# Patient Record
Sex: MALE | Race: WHITE | Employment: FULL TIME | URBAN - NONMETROPOLITAN AREA
[De-identification: names, ages, dates, MRNs, and addresses within clinical notes are randomized per-mention and may not be internally consistent; named-entity substitution may affect disease eponyms.]

---

## 2018-04-05 ENCOUNTER — TELEPHONE (OUTPATIENT)
Dept: FAMILY MEDICINE CLINIC | Age: 38
End: 2018-04-05

## 2018-04-18 ENCOUNTER — OFFICE VISIT (OUTPATIENT)
Dept: FAMILY MEDICINE CLINIC | Age: 38
End: 2018-04-18
Payer: COMMERCIAL

## 2018-04-18 VITALS
HEART RATE: 68 BPM | WEIGHT: 182 LBS | BODY MASS INDEX: 26.96 KG/M2 | DIASTOLIC BLOOD PRESSURE: 74 MMHG | HEIGHT: 69 IN | SYSTOLIC BLOOD PRESSURE: 132 MMHG | TEMPERATURE: 98.7 F

## 2018-04-18 DIAGNOSIS — Z13.220 SCREENING FOR HYPERLIPIDEMIA: Primary | ICD-10-CM

## 2018-04-18 DIAGNOSIS — R53.82 CHRONIC FATIGUE: ICD-10-CM

## 2018-04-18 PROCEDURE — 99203 OFFICE O/P NEW LOW 30 MIN: CPT | Performed by: NURSE PRACTITIONER

## 2018-04-18 ASSESSMENT — PATIENT HEALTH QUESTIONNAIRE - PHQ9
2. FEELING DOWN, DEPRESSED OR HOPELESS: 0
SUM OF ALL RESPONSES TO PHQ QUESTIONS 1-9: 0
SUM OF ALL RESPONSES TO PHQ9 QUESTIONS 1 & 2: 0
1. LITTLE INTEREST OR PLEASURE IN DOING THINGS: 0

## 2018-05-03 ENCOUNTER — HOSPITAL ENCOUNTER (OUTPATIENT)
Age: 38
Discharge: HOME | End: 2018-05-03
Payer: COMMERCIAL

## 2018-05-03 ENCOUNTER — TELEPHONE (OUTPATIENT)
Dept: FAMILY MEDICINE CLINIC | Age: 38
End: 2018-05-03

## 2018-05-03 DIAGNOSIS — R53.82 CHRONIC FATIGUE: ICD-10-CM

## 2018-05-03 DIAGNOSIS — Z13.220 SCREENING FOR HYPERLIPIDEMIA: ICD-10-CM

## 2018-05-03 LAB
ALBUMIN SERPL-MCNC: 4.5 G/DL (ref 3.5–5.1)
ALP BLD-CCNC: 65 U/L (ref 38–126)
ALT SERPL-CCNC: 16 U/L (ref 11–66)
ANION GAP SERPL CALCULATED.3IONS-SCNC: 13 MEQ/L (ref 8–16)
ANISOCYTOSIS: ABNORMAL
AST SERPL-CCNC: 22 U/L (ref 5–40)
BASOPHILS # BLD: 0.6 %
BASOPHILS ABSOLUTE: 0.1 THOU/MM3 (ref 0–0.1)
BILIRUB SERPL-MCNC: 0.3 MG/DL (ref 0.3–1.2)
BUN BLDV-MCNC: 21 MG/DL (ref 7–22)
CALCIUM SERPL-MCNC: 9 MG/DL (ref 8.5–10.5)
CHLORIDE BLD-SCNC: 105 MEQ/L (ref 98–111)
CHOLESTEROL, TOTAL: 161 MG/DL (ref 100–199)
CO2: 22 MEQ/L (ref 23–33)
CREAT SERPL-MCNC: 1.1 MG/DL (ref 0.4–1.2)
EOSINOPHIL # BLD: 2.8 %
EOSINOPHILS ABSOLUTE: 0.2 THOU/MM3 (ref 0–0.4)
GFR SERPL CREATININE-BSD FRML MDRD: 75 ML/MIN/1.73M2
GLUCOSE BLD-MCNC: 94 MG/DL (ref 70–108)
HCT VFR BLD CALC: 41 % (ref 42–52)
HDLC SERPL-MCNC: 36 MG/DL
HEMOGLOBIN: 13.7 GM/DL (ref 14–18)
LDL CHOLESTEROL CALCULATED: 110 MG/DL
LYMPHOCYTES # BLD: 35.1 %
LYMPHOCYTES ABSOLUTE: 3.1 THOU/MM3 (ref 1–4.8)
MCH RBC QN AUTO: 30.6 PG (ref 27–31)
MCHC RBC AUTO-ENTMCNC: 33.5 GM/DL (ref 33–37)
MCV RBC AUTO: 91.4 FL (ref 80–94)
MONOCYTES # BLD: 8.6 %
MONOCYTES ABSOLUTE: 0.8 THOU/MM3 (ref 0.4–1.3)
NUCLEATED RED BLOOD CELLS: 0 /100 WBC
PDW BLD-RTO: 14.5 % (ref 11.5–14.5)
PLATELET # BLD: 275 THOU/MM3 (ref 130–400)
PMV BLD AUTO: 8.9 FL (ref 7.4–10.4)
POTASSIUM SERPL-SCNC: 4.2 MEQ/L (ref 3.5–5.2)
RBC # BLD: 4.49 MILL/MM3 (ref 4.7–6.1)
SEG NEUTROPHILS: 52.9 %
SEGMENTED NEUTROPHILS ABSOLUTE COUNT: 4.7 THOU/MM3 (ref 1.8–7.7)
SODIUM BLD-SCNC: 140 MEQ/L (ref 135–145)
TOTAL PROTEIN: 6.9 G/DL (ref 6.1–8)
TRIGL SERPL-MCNC: 76 MG/DL (ref 0–199)
TSH SERPL DL<=0.05 MIU/L-ACNC: 2.72 UIU/ML (ref 0.4–4.2)
WBC # BLD: 8.9 THOU/MM3 (ref 4.8–10.8)

## 2018-05-03 PROCEDURE — 85025 COMPLETE CBC W/AUTO DIFF WBC: CPT

## 2018-05-03 PROCEDURE — 87389 HIV-1 AG W/HIV-1&-2 AB AG IA: CPT

## 2018-05-03 PROCEDURE — 36415 COLL VENOUS BLD VENIPUNCTURE: CPT

## 2018-05-03 PROCEDURE — 80061 LIPID PANEL: CPT

## 2018-05-03 PROCEDURE — 80053 COMPREHEN METABOLIC PANEL: CPT

## 2018-05-03 PROCEDURE — 84443 ASSAY THYROID STIM HORMONE: CPT

## 2018-05-05 LAB — HIV-2 AB: NEGATIVE

## 2018-05-07 ENCOUNTER — TELEPHONE (OUTPATIENT)
Dept: FAMILY MEDICINE CLINIC | Age: 38
End: 2018-05-07

## 2018-10-10 ENCOUNTER — HOSPITAL ENCOUNTER (EMERGENCY)
Age: 38
Discharge: HOME | End: 2018-10-10
Attending: EMERGENCY MEDICINE
Payer: COMMERCIAL

## 2018-10-10 VITALS
DIASTOLIC BLOOD PRESSURE: 60 MMHG | WEIGHT: 175 LBS | BODY MASS INDEX: 25.05 KG/M2 | TEMPERATURE: 98.2 F | HEART RATE: 71 BPM | OXYGEN SATURATION: 96 % | RESPIRATION RATE: 16 BRPM | SYSTOLIC BLOOD PRESSURE: 101 MMHG | HEIGHT: 70 IN

## 2018-10-10 DIAGNOSIS — B34.9 SYSTEMIC VIRAL ILLNESS: Primary | ICD-10-CM

## 2018-10-10 DIAGNOSIS — J11.1 INFLUENZA-LIKE ILLNESS: ICD-10-CM

## 2018-10-10 DIAGNOSIS — F17.200 TOBACCO USE DISORDER: ICD-10-CM

## 2018-10-10 LAB
FLU A ANTIGEN: NEGATIVE
FLU B ANTIGEN: NEGATIVE

## 2018-10-10 PROCEDURE — 6360000002 HC RX W HCPCS: Performed by: EMERGENCY MEDICINE

## 2018-10-10 PROCEDURE — 99214 OFFICE O/P EST MOD 30 MIN: CPT

## 2018-10-10 PROCEDURE — 99213 OFFICE O/P EST LOW 20 MIN: CPT | Performed by: EMERGENCY MEDICINE

## 2018-10-10 PROCEDURE — 87804 INFLUENZA ASSAY W/OPTIC: CPT

## 2018-10-10 RX ORDER — ONDANSETRON 4 MG/1
4 TABLET, ORALLY DISINTEGRATING ORAL EVERY 8 HOURS PRN
Qty: 10 TABLET | Refills: 0 | Status: SHIPPED | OUTPATIENT
Start: 2018-10-10 | End: 2022-09-23

## 2018-10-10 RX ORDER — ONDANSETRON 4 MG/1
8 TABLET, ORALLY DISINTEGRATING ORAL ONCE
Status: COMPLETED | OUTPATIENT
Start: 2018-10-10 | End: 2018-10-10

## 2018-10-10 RX ADMIN — ONDANSETRON 8 MG: 4 TABLET, ORALLY DISINTEGRATING ORAL at 09:38

## 2018-10-10 ASSESSMENT — ENCOUNTER SYMPTOMS
BACK PAIN: 0
STRIDOR: 0
SINUS PRESSURE: 0
ABDOMINAL PAIN: 0
COUGH: 0
WHEEZING: 0
SORE THROAT: 0
VOMITING: 0
DIARRHEA: 0
NAUSEA: 0
EYE DISCHARGE: 0
EYE REDNESS: 0
VOICE CHANGE: 0
EYE PAIN: 0
SHORTNESS OF BREATH: 0
TROUBLE SWALLOWING: 0

## 2018-10-10 ASSESSMENT — PAIN DESCRIPTION - DESCRIPTORS: DESCRIPTORS: SHARP

## 2018-10-10 ASSESSMENT — PAIN DESCRIPTION - PAIN TYPE: TYPE: ACUTE PAIN

## 2018-10-10 ASSESSMENT — PAIN DESCRIPTION - LOCATION: LOCATION: ABDOMEN

## 2018-10-10 ASSESSMENT — PAIN SCALES - GENERAL: PAINLEVEL_OUTOF10: 7

## 2018-10-10 NOTE — ED PROVIDER NOTES
Discharge Medication List as of 10/10/2018 10:09 AM          ALLERGIES     Patient is has No Known Allergies. FAMILY HISTORY     Patient's family history includes Cancer in his father; No Known Problems in his mother, sister, and sister. SOCIAL HISTORY     Patient  reports that he has been smoking. He has a 20.00 pack-year smoking history. He has never used smokeless tobacco. He reports that he drinks alcohol. He reports that he uses drugs, including Marijuana, about 2 times per week. PHYSICAL EXAM     ED TRIAGE VITALS  BP: 101/60, Temp: 98.2 °F (36.8 °C), Pulse: 71, Resp: 16, SpO2: 96 %  Physical Exam   Constitutional: He is oriented to person, place, and time. He appears well-developed and well-nourished. No distress. decent hydration, normal airway   HENT:   Head: Normocephalic and atraumatic. Right Ear: Tympanic membrane and external ear normal.   Left Ear: Tympanic membrane and external ear normal.   Nose: Nose normal. No rhinorrhea. Right sinus exhibits no maxillary sinus tenderness and no frontal sinus tenderness. Left sinus exhibits no maxillary sinus tenderness and no frontal sinus tenderness. Mouth/Throat: Oropharynx is clear and moist. No trismus in the jaw. No uvula swelling. No oropharyngeal exudate, posterior oropharyngeal edema, posterior oropharyngeal erythema or tonsillar abscesses. Eyes: Pupils are equal, round, and reactive to light. Conjunctivae and EOM are normal. Right eye exhibits no discharge. Left eye exhibits no discharge. No scleral icterus. Neck: Normal range of motion. No JVD present. No thyromegaly present. No meningismus   Cardiovascular: Normal rate, regular rhythm, S1 normal, S2 normal, normal heart sounds, intact distal pulses and normal pulses. Exam reveals no gallop and no friction rub. No murmur heard. Pulmonary/Chest: Effort normal and breath sounds normal. No stridor. No tachypnea. No respiratory distress. He has no decreased breath sounds.  He

## 2018-10-11 ENCOUNTER — APPOINTMENT (OUTPATIENT)
Dept: GENERAL RADIOLOGY | Age: 38
End: 2018-10-11
Payer: COMMERCIAL

## 2018-10-11 ENCOUNTER — HOSPITAL ENCOUNTER (EMERGENCY)
Age: 38
Discharge: HOME | End: 2018-10-11
Payer: COMMERCIAL

## 2018-10-11 VITALS
RESPIRATION RATE: 16 BRPM | HEART RATE: 70 BPM | BODY MASS INDEX: 25.05 KG/M2 | HEIGHT: 70 IN | DIASTOLIC BLOOD PRESSURE: 78 MMHG | OXYGEN SATURATION: 100 % | SYSTOLIC BLOOD PRESSURE: 124 MMHG | WEIGHT: 175 LBS | TEMPERATURE: 97.7 F

## 2018-10-11 DIAGNOSIS — B34.9 VIRAL ILLNESS: Primary | ICD-10-CM

## 2018-10-11 DIAGNOSIS — M25.50 POLYARTHRALGIA: ICD-10-CM

## 2018-10-11 LAB
ALBUMIN SERPL-MCNC: 4.9 G/DL (ref 3.5–5.1)
ALP BLD-CCNC: 68 U/L (ref 38–126)
ALT SERPL-CCNC: 13 U/L (ref 11–66)
ANION GAP SERPL CALCULATED.3IONS-SCNC: 16 MEQ/L (ref 8–16)
AST SERPL-CCNC: 20 U/L (ref 5–40)
BACTERIA: ABNORMAL /HPF
BASOPHILS # BLD: 0.7 %
BASOPHILS ABSOLUTE: 0 THOU/MM3 (ref 0–0.1)
BILIRUB SERPL-MCNC: 0.7 MG/DL (ref 0.3–1.2)
BILIRUBIN DIRECT: < 0.2 MG/DL (ref 0–0.3)
BILIRUBIN URINE: ABNORMAL
BLOOD, URINE: NEGATIVE
BUN BLDV-MCNC: 19 MG/DL (ref 7–22)
C-REACTIVE PROTEIN: 0.05 MG/DL (ref 0–1)
CALCIUM SERPL-MCNC: 9.6 MG/DL (ref 8.5–10.5)
CASTS 2: ABNORMAL /LPF
CASTS UA: ABNORMAL /LPF
CHARACTER, URINE: CLEAR
CHLORIDE BLD-SCNC: 100 MEQ/L (ref 98–111)
CO2: 21 MEQ/L (ref 23–33)
COLOR: ABNORMAL
CREAT SERPL-MCNC: 0.9 MG/DL (ref 0.4–1.2)
CRYSTALS, UA: ABNORMAL
EOSINOPHIL # BLD: 1.3 %
EOSINOPHILS ABSOLUTE: 0.1 THOU/MM3 (ref 0–0.4)
EPITHELIAL CELLS, UA: ABNORMAL /HPF
ERYTHROCYTE [DISTWIDTH] IN BLOOD BY AUTOMATED COUNT: 12.4 % (ref 11.5–14.5)
ERYTHROCYTE [DISTWIDTH] IN BLOOD BY AUTOMATED COUNT: 40.5 FL (ref 35–45)
GFR SERPL CREATININE-BSD FRML MDRD: > 90 ML/MIN/1.73M2
GLUCOSE BLD-MCNC: 105 MG/DL (ref 70–108)
GLUCOSE URINE: NEGATIVE MG/DL
HCT VFR BLD CALC: 40.8 % (ref 42–52)
HEMOGLOBIN: 14.2 GM/DL (ref 14–18)
ICTOTEST: NEGATIVE
IMMATURE GRANS (ABS): 0.02 THOU/MM3 (ref 0–0.07)
IMMATURE GRANULOCYTES: 0.3 %
KETONES, URINE: >= 160
LEUKOCYTE ESTERASE, URINE: NEGATIVE
LYMPHOCYTES # BLD: 37.8 %
LYMPHOCYTES ABSOLUTE: 2.6 THOU/MM3 (ref 1–4.8)
MCH RBC QN AUTO: 31 PG (ref 26–33)
MCHC RBC AUTO-ENTMCNC: 34.8 GM/DL (ref 32.2–35.5)
MCV RBC AUTO: 89.1 FL (ref 80–94)
MISCELLANEOUS 2: ABNORMAL
MONOCYTES # BLD: 8.9 %
MONOCYTES ABSOLUTE: 0.6 THOU/MM3 (ref 0.4–1.3)
NITRITE, URINE: NEGATIVE
NUCLEATED RED BLOOD CELLS: 0 /100 WBC
OSMOLALITY CALCULATION: 276.4 MOSMOL/KG (ref 275–300)
PH UA: 6
PLATELET # BLD: 286 THOU/MM3 (ref 130–400)
PMV BLD AUTO: 9.8 FL (ref 9.4–12.4)
POTASSIUM SERPL-SCNC: 3.8 MEQ/L (ref 3.5–5.2)
PROTEIN UA: ABNORMAL
RBC # BLD: 4.58 MILL/MM3 (ref 4.7–6.1)
RBC URINE: ABNORMAL /HPF
RENAL EPITHELIAL, UA: ABNORMAL
SEDIMENTATION RATE, ERYTHROCYTE: 5 MM/HR (ref 0–10)
SEG NEUTROPHILS: 51 %
SEGMENTED NEUTROPHILS ABSOLUTE COUNT: 3.5 THOU/MM3 (ref 1.8–7.7)
SODIUM BLD-SCNC: 137 MEQ/L (ref 135–145)
SPECIFIC GRAVITY, URINE: > 1.03 (ref 1–1.03)
TOTAL CK: 83 U/L (ref 55–170)
TOTAL PROTEIN: 7.5 G/DL (ref 6.1–8)
UROBILINOGEN, URINE: 1 EU/DL
WBC # BLD: 6.9 THOU/MM3 (ref 4.8–10.8)
WBC UA: ABNORMAL /HPF
YEAST: ABNORMAL

## 2018-10-11 PROCEDURE — 81001 URINALYSIS AUTO W/SCOPE: CPT

## 2018-10-11 PROCEDURE — 82248 BILIRUBIN DIRECT: CPT

## 2018-10-11 PROCEDURE — 86140 C-REACTIVE PROTEIN: CPT

## 2018-10-11 PROCEDURE — 96374 THER/PROPH/DIAG INJ IV PUSH: CPT

## 2018-10-11 PROCEDURE — 6370000000 HC RX 637 (ALT 250 FOR IP): Performed by: PHYSICIAN ASSISTANT

## 2018-10-11 PROCEDURE — 85025 COMPLETE CBC W/AUTO DIFF WBC: CPT

## 2018-10-11 PROCEDURE — 80053 COMPREHEN METABOLIC PANEL: CPT

## 2018-10-11 PROCEDURE — 73502 X-RAY EXAM HIP UNI 2-3 VIEWS: CPT

## 2018-10-11 PROCEDURE — 99284 EMERGENCY DEPT VISIT MOD MDM: CPT

## 2018-10-11 PROCEDURE — 2580000003 HC RX 258: Performed by: PHYSICIAN ASSISTANT

## 2018-10-11 PROCEDURE — 85651 RBC SED RATE NONAUTOMATED: CPT

## 2018-10-11 PROCEDURE — 6360000002 HC RX W HCPCS: Performed by: PHYSICIAN ASSISTANT

## 2018-10-11 PROCEDURE — 82550 ASSAY OF CK (CPK): CPT

## 2018-10-11 PROCEDURE — 36415 COLL VENOUS BLD VENIPUNCTURE: CPT

## 2018-10-11 PROCEDURE — 96375 TX/PRO/DX INJ NEW DRUG ADDON: CPT

## 2018-10-11 RX ORDER — IBUPROFEN 800 MG/1
800 TABLET ORAL EVERY 6 HOURS PRN
Qty: 30 TABLET | Refills: 0 | Status: SHIPPED | OUTPATIENT
Start: 2018-10-11 | End: 2022-09-23

## 2018-10-11 RX ORDER — PANTOPRAZOLE SODIUM 20 MG/1
20 TABLET, DELAYED RELEASE ORAL DAILY
Qty: 30 TABLET | Refills: 0 | Status: SHIPPED | OUTPATIENT
Start: 2018-10-11 | End: 2022-09-23

## 2018-10-11 RX ORDER — METOCLOPRAMIDE HYDROCHLORIDE 5 MG/ML
10 INJECTION INTRAMUSCULAR; INTRAVENOUS ONCE
Status: COMPLETED | OUTPATIENT
Start: 2018-10-11 | End: 2018-10-11

## 2018-10-11 RX ORDER — DIPHENHYDRAMINE HYDROCHLORIDE 50 MG/ML
25 INJECTION INTRAMUSCULAR; INTRAVENOUS ONCE
Status: COMPLETED | OUTPATIENT
Start: 2018-10-11 | End: 2018-10-11

## 2018-10-11 RX ORDER — 0.9 % SODIUM CHLORIDE 0.9 %
500 INTRAVENOUS SOLUTION INTRAVENOUS ONCE
Status: COMPLETED | OUTPATIENT
Start: 2018-10-11 | End: 2018-10-11

## 2018-10-11 RX ORDER — PROMETHAZINE HYDROCHLORIDE 25 MG/1
25 TABLET ORAL EVERY 6 HOURS PRN
Qty: 15 TABLET | Refills: 0 | Status: SHIPPED | OUTPATIENT
Start: 2018-10-11 | End: 2022-09-23

## 2018-10-11 RX ADMIN — SODIUM CHLORIDE 500 ML: 9 INJECTION, SOLUTION INTRAVENOUS at 14:38

## 2018-10-11 RX ADMIN — METOCLOPRAMIDE 10 MG: 5 INJECTION, SOLUTION INTRAMUSCULAR; INTRAVENOUS at 14:49

## 2018-10-11 RX ADMIN — DIPHENHYDRAMINE HYDROCHLORIDE 25 MG: 50 INJECTION, SOLUTION INTRAMUSCULAR; INTRAVENOUS at 14:47

## 2018-10-11 RX ADMIN — LIDOCAINE HYDROCHLORIDE: 20 SOLUTION ORAL; TOPICAL at 14:49

## 2018-10-11 ASSESSMENT — ENCOUNTER SYMPTOMS
TROUBLE SWALLOWING: 0
ABDOMINAL PAIN: 1
SHORTNESS OF BREATH: 0
NAUSEA: 1
COUGH: 0
COLOR CHANGE: 0
DIARRHEA: 0
VOMITING: 1
SORE THROAT: 0

## 2018-10-11 ASSESSMENT — PAIN SCALES - GENERAL: PAINLEVEL_OUTOF10: 8

## 2018-10-11 ASSESSMENT — PAIN DESCRIPTION - PAIN TYPE: TYPE: ACUTE PAIN

## 2018-10-11 ASSESSMENT — PAIN DESCRIPTION - ORIENTATION: ORIENTATION: RIGHT;LEFT

## 2018-10-11 ASSESSMENT — PAIN DESCRIPTION - FREQUENCY: FREQUENCY: CONTINUOUS

## 2018-10-11 NOTE — ED NOTES
Patient resting quietly in cot with eyes closed. Friend at bedside. Wakes to voice. Rates pain 7/10. Shows no signs of distress at this time. Will continue to monitor.       Compa Villasenor RN  10/11/18 5081

## 2018-10-11 NOTE — ED TRIAGE NOTES
Patient presents to ED for nausea, abdominal pain and headache since Sunday. Patient reports in the last couple of days he has had pain in bilateral hips and legs. Was seen at urgent care yesterday and sent home with a prescription for zofran that he has not filled yet. Rates pain 8/10. Shows no signs of distress. Skin warm and dry. Respirations easy and unlabored.

## 2021-07-02 ENCOUNTER — HOSPITAL ENCOUNTER (EMERGENCY)
Age: 41
Discharge: HOME | End: 2021-07-02
Payer: COMMERCIAL

## 2021-07-02 VITALS
HEART RATE: 67 BPM | OXYGEN SATURATION: 98 % | TEMPERATURE: 97.6 F | RESPIRATION RATE: 17 BRPM | SYSTOLIC BLOOD PRESSURE: 153 MMHG | DIASTOLIC BLOOD PRESSURE: 97 MMHG

## 2021-07-02 DIAGNOSIS — K04.01 ACUTE PULPITIS: Primary | ICD-10-CM

## 2021-07-02 PROCEDURE — 6370000000 HC RX 637 (ALT 250 FOR IP): Performed by: NURSE PRACTITIONER

## 2021-07-02 PROCEDURE — 99282 EMERGENCY DEPT VISIT SF MDM: CPT

## 2021-07-02 RX ORDER — AMOXICILLIN AND CLAVULANATE POTASSIUM 875; 125 MG/1; MG/1
1 TABLET, FILM COATED ORAL 2 TIMES DAILY
Qty: 14 TABLET | Refills: 0 | Status: SHIPPED | OUTPATIENT
Start: 2021-07-02 | End: 2021-07-09

## 2021-07-02 RX ORDER — HYDROCODONE BITARTRATE AND ACETAMINOPHEN 5; 325 MG/1; MG/1
1 TABLET ORAL ONCE
Status: COMPLETED | OUTPATIENT
Start: 2021-07-02 | End: 2021-07-02

## 2021-07-02 RX ORDER — AMOXICILLIN AND CLAVULANATE POTASSIUM 875; 125 MG/1; MG/1
1 TABLET, FILM COATED ORAL EVERY 12 HOURS SCHEDULED
Status: DISCONTINUED | OUTPATIENT
Start: 2021-07-02 | End: 2021-07-03 | Stop reason: HOSPADM

## 2021-07-02 RX ADMIN — Medication 5 ML: at 23:13

## 2021-07-02 RX ADMIN — AMOXICILLIN AND CLAVULANATE POTASSIUM 1 TABLET: 875; 125 TABLET, FILM COATED ORAL at 23:13

## 2021-07-02 RX ADMIN — HYDROCODONE BITARTRATE AND ACETAMINOPHEN 1 TABLET: 5; 325 TABLET ORAL at 23:13

## 2021-07-02 ASSESSMENT — PAIN SCALES - GENERAL
PAINLEVEL_OUTOF10: 10
PAINLEVEL_OUTOF10: 10

## 2021-07-02 ASSESSMENT — PAIN DESCRIPTION - LOCATION: LOCATION: TEETH

## 2021-07-02 ASSESSMENT — PAIN DESCRIPTION - PAIN TYPE: TYPE: ACUTE PAIN

## 2021-07-03 ASSESSMENT — ENCOUNTER SYMPTOMS
COLOR CHANGE: 0
BACK PAIN: 0
FACIAL SWELLING: 0
SORE THROAT: 0
WHEEZING: 0
APNEA: 0
CHOKING: 0
TROUBLE SWALLOWING: 0
VOMITING: 0
NAUSEA: 0

## 2021-07-03 NOTE — ED PROVIDER NOTES
[facility] Emergency Department    CHIEF COMPLAINT       Chief Complaint   Patient presents with    Dental Injury       Nurses Notes reviewed and I agree except as noted in the HPI. HISTORY OF PRESENT ILLNESS    Lucie Miller is a 39 y.o. male who presents to the ED for evaluation of dental pain. Patient notes pain to the left lower molars, he notes that he was eating something recently and felt a crack. He notes history of periodontal disease. He notes he follows with [facility] dentistry and plans to have all of his teeth removed. He denies fevers or chills, denies any difficulty opening or closing his mouth. He notes significant pain with eating. He denies any neck pain. He denies any significant past medical history. Denies any allergies. HPI was provided by the patient. REVIEW OF SYSTEMS     Review of Systems   Constitutional: Negative for activity change, chills and fever. HENT: Positive for dental problem. Negative for congestion, ear pain, facial swelling, mouth sores, sore throat and trouble swallowing. Respiratory: Negative for apnea, choking and wheezing. Gastrointestinal: Negative for nausea and vomiting. Genitourinary: Negative for decreased urine volume, difficulty urinating and dysuria. Musculoskeletal: Negative for arthralgias and back pain. Skin: Negative for color change and rash. Allergic/Immunologic: Negative for immunocompromised state. Neurological: Negative for dizziness, weakness, light-headedness, numbness and headaches. Hematological: Does not bruise/bleed easily. Psychiatric/Behavioral: Negative for agitation, behavioral problems and confusion. PAST MEDICAL HISTORY   No past medical history on file. SURGICALHISTORY      has no past surgical history on file.     CURRENT MEDICATIONS       Discharge Medication List as of 7/2/2021 11:07 PM      CONTINUE these medications which have NOT CHANGED    Details   ibuprofen (ADVIL;MOTRIN) 800 MG tablet Take 1 tablet by mouth every 6 hours as needed for Pain, Disp-30 tablet, R-0Print      pantoprazole (PROTONIX) 20 MG tablet Take 1 tablet by mouth daily, Disp-30 tablet, R-0Print      promethazine (PHENERGAN) 25 MG tablet Take 1 tablet by mouth every 6 hours as needed for Nausea, Disp-15 tablet, R-0Print      ondansetron (ZOFRAN ODT) 4 MG disintegrating tablet Take 1 tablet by mouth every 8 hours as needed for Nausea or Vomiting (Dissolve on tongue 4 times daily for nausea and vomiting), Disp-10 tablet, R-0Print             ALLERGIES     has No Known Allergies. FAMILY HISTORY     He indicated that his mother is alive. He indicated that his father is alive. He indicated that both of his sisters are alive. family history includes Cancer in his father; No Known Problems in his mother, sister, and sister. SOCIAL HISTORY       Social History     Socioeconomic History    Marital status:      Spouse name: Not on file    Number of children: Not on file    Years of education: Not on file    Highest education level: Not on file   Occupational History    Not on file   Tobacco Use    Smoking status: Current Every Day Smoker     Packs/day: 0.50     Years: 20.00     Pack years: 10.00    Smokeless tobacco: Never Used   Vaping Use    Vaping Use: Never used   Substance and Sexual Activity    Alcohol use: Yes     Comment: rarely     Drug use: Yes     Frequency: 2.0 times per week     Types: Marijuana    Sexual activity: Yes     Partners: Female     Birth control/protection: Pill   Other Topics Concern    Not on file   Social History Narrative    Not on file     Social Determinants of Health     Financial Resource Strain:     Difficulty of Paying Living Expenses:    Food Insecurity:     Worried About Running Out of Food in the Last Year:     Ran Out of Food in the Last Year:    Transportation Needs:     Lack of Transportation (Medical):      Lack of Transportation (Non-Medical):    Physical Activity:     Days of Exercise per Week:     Minutes of Exercise per Session:    Stress:     Feeling of Stress :    Social Connections:     Frequency of Communication with Friends and Family:     Frequency of Social Gatherings with Friends and Family:     Attends Christianity Services:     Active Member of Clubs or Organizations:     Attends Club or Organization Meetings:     Marital Status:    Intimate Partner Violence:     Fear of Current or Ex-Partner:     Emotionally Abused:     Physically Abused:     Sexually Abused:        PHYSICAL EXAM     INITIAL VITALS:  oral temperature is 97.6 °F (36.4 °C). His blood pressure is 153/97 (abnormal) and his pulse is 67. His respiration is 17 and oxygen saturation is 98%. Physical Exam  Vitals and nursing note reviewed. Constitutional:       Appearance: Normal appearance. He is well-developed. HENT:      Head: Normocephalic. Right Ear: External ear normal.      Left Ear: External ear normal.      Nose: Nose normal.      Mouth/Throat:      Lips: Pink. Mouth: Mucous membranes are moist.      Dentition: Abnormal dentition. Dental tenderness and dental caries present. No gingival swelling, dental abscesses or gum lesions. Tongue: No lesions. Palate: No mass and lesions. Pharynx: Oropharynx is clear. Uvula midline. No pharyngeal swelling or posterior oropharyngeal erythema. Eyes:      Conjunctiva/sclera: Conjunctivae normal.   Cardiovascular:      Rate and Rhythm: Normal rate and regular rhythm. Heart sounds: Normal heart sounds, S1 normal and S2 normal.   Pulmonary:      Effort: Pulmonary effort is normal. No respiratory distress. Breath sounds: Normal breath sounds. Chest:      Chest wall: No tenderness. Abdominal:      General: Bowel sounds are normal. There is no distension. Palpations: Abdomen is soft. Tenderness: There is no abdominal tenderness. Musculoskeletal:         General: Normal range of motion. Cervical back: Normal range of motion and neck supple. No rigidity or tenderness. Lymphadenopathy:      Cervical: No cervical adenopathy. Skin:     General: Skin is warm and dry. Capillary Refill: Capillary refill takes less than 2 seconds. Coloration: Skin is not pale. Findings: No erythema or rash. Neurological:      General: No focal deficit present. Mental Status: He is alert and oriented to person, place, and time. Psychiatric:         Behavior: Behavior normal.         Thought Content: Thought content normal.         Judgment: Judgment normal.         DIFFERENTIAL DIAGNOSIS:   Dental abscess, periodontal disease, pulpitis,    DIAGNOSTIC RESULTS       RADIOLOGY: non-plainfilm images(s) such as CT, Ultrasound and MRI are read by the radiologist.  Plain radiographic images are visualized and preliminarily interpreted by the emergency physician unless otherwise stated below. No orders to display         LABS:   Labs Reviewed - No data to display    EMERGENCY DEPARTMENT COURSE:   Vitals:    Vitals:    07/02/21 2210   BP: (!) 153/97   Pulse: 67   Resp: 17   Temp: 97.6 °F (36.4 °C)   TempSrc: Oral   SpO2: 98%     MDM    Patient was seen and evaluated in the emergency department, patient appeared to be in no acute distress, vital signs reviewed, hypertension noted. Physical exam was completed, there was multiple cavities throughout the teeth, there was some slight erythema to the gumline, no significant edema, minimal tooth tenderness. No trismus no neck pain no significant adenopathy. Discussed my findings my plan of care the patient is amenable with discharge. History with medications below, he is advised to follow-up with dentistry, return to the emergency department there worsening signs and symptoms. He verbalized understanding plan of care.   Medications   magic (miracle) mouthwash (5 mLs Swish & Spit Given 7/2/21 8601)   HYDROcodone-acetaminophen (NORCO) 5-325 MG per tablet 1 tablet (1 tablet Oral Given 7/2/21 5418)       Patient was seenindependently by myself. The patient's final impression and disposition and plan was determined by myself. CRITICAL CARE:   None    CONSULTS:  None    PROCEDURES:  None    FINAL IMPRESSION     1. Acute pulpitis          DISPOSITION/PLAN   Patient discharged in stable condition    PATIENT REFERREDTO:  your dentist    Call   For follow up and evaluation      DISCHARGE MEDICATIONS:  Discharge Medication List as of 7/2/2021 11:07 PM      START taking these medications    Details   amoxicillin-clavulanate (AUGMENTIN) 875-125 MG per tablet Take 1 tablet by mouth 2 times daily for 7 days, Disp-14 tablet, R-0Normal      Magic Mouthwash (MIRACLE MOUTHWASH) Swish and spit 5 mLs 4 times daily as needed for Irritation 1:1:1, lidocaine, diphenhydramine, maalox, Disp-240 mL, R-0Normal             (Please note that portions of this note were completed with a voice recognition program.  Efforts were made to edit the dictations but occasionally words are mis-transcribed.)    Provider:  I personally performed the services described in the documentation,reviewed and edited the documentation which was dictated to the scribe in my presence, and it accurately records my words and actions.     Rebel Hernandez CNP 07/03/21 6:09 PM    Derik Hernandez APRGRISEL - SONALI        [employer], CHERI - CNP  07/03/21 8709

## 2022-01-12 ENCOUNTER — TELEPHONE (OUTPATIENT)
Dept: FAMILY MEDICINE CLINIC | Age: 42
End: 2022-01-12

## 2022-01-12 NOTE — TELEPHONE ENCOUNTER
----- Message from Deidra Jazmyn sent at 1/12/2022  8:17 AM EST -----  Subject: Message to Provider    QUESTIONS  Information for Provider? Patient tested positive for Covid yesterday at   [facility]. He would like to know if there is anything he can take for this. He is experiencing a no taste or smell, no appetite, fatigue, cough, sore   throat, and he had a headache last week. Please advise  ---------------------------------------------------------------------------  --------------  CALL BACK INFO  What is the best way for the office to contact you? OK to leave message on   voicemail  Preferred Call Back Phone Number? [phone]  ---------------------------------------------------------------------------  --------------  SCRIPT ANSWERS  Relationship to Patient?  Self

## 2022-01-12 NOTE — TELEPHONE ENCOUNTER
Honestly, if he is able to manage symptoms with OTC meds, he doesn't need to do anything else. I would suggest taking 500 mg vitamin C, 5000 units vitamin D and 50 mg zinc which can help boost his immune system. If he's unable to manage symptoms, i'm ok to see him for a RED appt.

## 2022-04-20 ENCOUNTER — HOSPITAL ENCOUNTER (EMERGENCY)
Age: 42
Discharge: HOME | End: 2022-04-20
Payer: COMMERCIAL

## 2022-04-20 VITALS
DIASTOLIC BLOOD PRESSURE: 73 MMHG | HEIGHT: 71 IN | SYSTOLIC BLOOD PRESSURE: 139 MMHG | RESPIRATION RATE: 18 BRPM | WEIGHT: 190 LBS | OXYGEN SATURATION: 97 % | TEMPERATURE: 98.5 F | BODY MASS INDEX: 26.6 KG/M2 | HEART RATE: 66 BPM

## 2022-04-20 DIAGNOSIS — K05.00 GINGIVITIS, ACUTE, PLAQUE INDUCED: Primary | ICD-10-CM

## 2022-04-20 DIAGNOSIS — K08.89 PAIN, DENTAL: ICD-10-CM

## 2022-04-20 PROCEDURE — 99202 OFFICE O/P NEW SF 15 MIN: CPT | Performed by: NURSE PRACTITIONER

## 2022-04-20 PROCEDURE — 99213 OFFICE O/P EST LOW 20 MIN: CPT

## 2022-04-20 RX ORDER — AMOXICILLIN AND CLAVULANATE POTASSIUM 875; 125 MG/1; MG/1
1 TABLET, FILM COATED ORAL 2 TIMES DAILY
Qty: 20 TABLET | Refills: 0 | Status: SHIPPED | OUTPATIENT
Start: 2022-04-20 | End: 2022-04-30

## 2022-04-20 RX ORDER — CHLORHEXIDINE GLUCONATE 0.12 MG/ML
15 RINSE ORAL 2 TIMES DAILY
Qty: 420 ML | Refills: 0 | Status: SHIPPED | OUTPATIENT
Start: 2022-04-20 | End: 2022-05-04

## 2022-04-20 ASSESSMENT — ENCOUNTER SYMPTOMS
NAUSEA: 0
EYE DISCHARGE: 0
FACIAL SWELLING: 0
SHORTNESS OF BREATH: 0
SORE THROAT: 0
RHINORRHEA: 0
COUGH: 0
VOMITING: 0
DIARRHEA: 0
TROUBLE SWALLOWING: 0
EYE REDNESS: 0

## 2022-04-20 NOTE — ED NOTES
Pt presents to [facility] with c/o lower gum pain and swelling.       Kael Johnson LPN  80/99/88 1240

## 2022-04-20 NOTE — ED PROVIDER NOTES
[id number]       Chief Complaint   Patient presents with    Oral Swelling     bottom       Nurses Notes reviewed and I agree except as noted in the HPI. HISTORY OF PRESENT ILLNESS   Aurora Ray is a 43 y.o. male who presents for evaluation of dental/gingival pain. Onset less than 1 week ago, worsening. Patient has not been to the dentist in [facility] long time. \"  No fever or facial swelling. Patient has plans to follow-up with a dentist for teeth removal.  No improvement with homeopathic/over-the-counter treatment. REVIEW OF SYSTEMS     Review of Systems   Constitutional: Negative for chills, diaphoresis, fatigue and fever. HENT: Positive for dental problem. Negative for congestion, ear pain, facial swelling, mouth sores, rhinorrhea, sore throat and trouble swallowing. Eyes: Negative for discharge and redness. Respiratory: Negative for cough and shortness of breath. Cardiovascular: Negative for chest pain. Gastrointestinal: Negative for diarrhea, nausea and vomiting. Genitourinary: Negative for decreased urine volume. Musculoskeletal: Negative for neck pain and neck stiffness. Skin: Negative for rash. Neurological: Negative for headaches. Hematological: Negative for adenopathy. Psychiatric/Behavioral: Negative for sleep disturbance. PAST MEDICAL HISTORY   No past medical history on file. SURGICAL HISTORY     Patient  has no past surgical history on file.     CURRENT MEDICATIONS       Discharge Medication List as of 4/20/2022 10:03 AM      CONTINUE these medications which have NOT CHANGED    Details   Magic Mouthwash (MIRACLE MOUTHWASH) Swish and spit 5 mLs 4 times daily as needed for Irritation 1:1:1, lidocaine, diphenhydramine, maalox, Disp-240 mL, R-0Normal      ibuprofen (ADVIL;MOTRIN) 800 MG tablet Take 1 tablet by mouth every 6 hours as needed for Pain, Disp-30 tablet, R-0Print      pantoprazole (PROTONIX) 20 MG tablet Take 1 tablet by mouth daily, Disp-30 tablet, R-0Print      promethazine (PHENERGAN) 25 MG tablet Take 1 tablet by mouth every 6 hours as needed for Nausea, Disp-15 tablet, R-0Print      ondansetron (ZOFRAN ODT) 4 MG disintegrating tablet Take 1 tablet by mouth every 8 hours as needed for Nausea or Vomiting (Dissolve on tongue 4 times daily for nausea and vomiting), Disp-10 tablet, R-0Print             ALLERGIES     Patient is has No Known Allergies. FAMILY HISTORY     Patient'sfamily history includes Cancer in his father; No Known Problems in his mother, sister, and sister. SOCIAL HISTORY     Patient  reports that he has been smoking. He has a 10.00 pack-year smoking history. He has never used smokeless tobacco. He reports current alcohol use. He reports current drug use. Frequency: 2.00 times per week. Drug: Marijuana [place]). PHYSICAL EXAM     ED TRIAGE VITALS  BP: 139/73, Temp: 98.5 °F (36.9 °C), Pulse: 66, Resp: 18, SpO2: 97 %  Physical Exam  Vitals and nursing note reviewed. Constitutional:       General: He is not in acute distress. Appearance: Normal appearance. He is well-developed. He is not ill-appearing, toxic-appearing or diaphoretic. HENT:      Head: Normocephalic and atraumatic. Jaw: No trismus. Right Ear: Hearing, tympanic membrane, ear canal and external ear normal. No mastoid tenderness. No hemotympanum. Tympanic membrane is not perforated, erythematous or bulging. Left Ear: Hearing, tympanic membrane, ear canal and external ear normal. No mastoid tenderness. No hemotympanum. Tympanic membrane is not perforated, erythematous or bulging. Nose: Nose normal.      Mouth/Throat:      Mouth: Mucous membranes are moist. No oral lesions or angioedema. Dentition: Dental tenderness and gingival swelling present. No dental abscesses or gum lesions. Pharynx: Oropharynx is clear. Uvula midline. Tonsils: No tonsillar abscesses.    Eyes: General: No scleral icterus. Conjunctiva/sclera: Conjunctivae normal.   Neck:      Thyroid: No thyromegaly. Trachea: Trachea normal.   Cardiovascular:      Rate and Rhythm: Normal rate and regular rhythm. No extrasystoles are present. Chest Wall: PMI is not displaced. Heart sounds: Normal heart sounds. No murmur heard. No friction rub. No gallop. Pulmonary:      Effort: Pulmonary effort is normal. No accessory muscle usage or respiratory distress. Breath sounds: Normal breath sounds. Chest:   Breasts:      Right: No supraclavicular adenopathy. Left: No supraclavicular adenopathy. Musculoskeletal:      Cervical back: Normal range of motion and neck supple. Lymphadenopathy:      Head:      Right side of head: No submental, submandibular, tonsillar, preauricular, posterior auricular or occipital adenopathy. Left side of head: No submental, submandibular, tonsillar, preauricular, posterior auricular or occipital adenopathy. Cervical: No cervical adenopathy. Upper Body:      Right upper body: No supraclavicular adenopathy. Left upper body: No supraclavicular adenopathy. Skin:     General: Skin is warm and dry. Coloration: Skin is not pale. Findings: No rash. Comments: Skin intact, warm and dry to touch, no rashes noted on exposed surfaces. Neurological:      Mental Status: He is alert and oriented to person, place, and time. He is not disoriented. Psychiatric:         Mood and Affect: Mood normal.         Behavior: Behavior is cooperative. DIAGNOSTIC RESULTS   Labs: No results found for this visit on 04/20/22.     IMAGING:  No orders to display     URGENT CARE COURSE:     Vitals:    04/20/22 0934   BP: 139/73   Pulse: 66   Resp: 18   Temp: 98.5 °F (36.9 °C)   TempSrc: Temporal   SpO2: 97%   Weight: 190 lb (86.2 kg)   Height: 5' 11\" (1.803 m)       Medications - No data to display  PROCEDURES:  None  FINALIMPRESSION      1. Gingivitis, acute, plaque induced    2. Pain, dental        DISPOSITION/PLAN   DISPOSITION Decision To Discharge 04/20/2022 10:02:09 AM  Nontoxic, no distress. Treat for acute gingivitis/dental pain. Medications as prescribed. Daily probiotic. Diet as tolerated. Follow-up with dentist.  If symptoms worsen return or go to ER. PATIENT REFERRED TO:  Dia Taylor, APRN - CNP  [address]  [address] [place] [address] [address] [address] [phone]      Follow-up as needed. Follow-up with dentist.  Medications as prescribed. If symptoms worsen return or go to ER.     DISCHARGE MEDICATIONS:  Discharge Medication List as of 4/20/2022 10:03 AM      START taking these medications    Details   chlorhexidine (PERIDEX) 0.12 % solution Take 15 mLs by mouth 2 times daily for 14 days, Disp-420 mL, R-0Normal      amoxicillin-clavulanate (AUGMENTIN) 875-125 MG per tablet Take 1 tablet by mouth 2 times daily for 10 days, Disp-20 tablet, R-0Normal           Discharge Medication List as of 4/20/2022 10:03 AM          [address] Monet Lau, APRN - CNP  04/20/22 1048

## 2022-09-23 ENCOUNTER — HOSPITAL ENCOUNTER (EMERGENCY)
Age: 42
Discharge: HOME | End: 2022-09-23
Attending: NURSE PRACTITIONER
Payer: COMMERCIAL

## 2022-09-23 ENCOUNTER — APPOINTMENT (OUTPATIENT)
Dept: GENERAL RADIOLOGY | Age: 42
End: 2022-09-23
Payer: COMMERCIAL

## 2022-09-23 VITALS
DIASTOLIC BLOOD PRESSURE: 78 MMHG | RESPIRATION RATE: 16 BRPM | HEART RATE: 78 BPM | SYSTOLIC BLOOD PRESSURE: 131 MMHG | OXYGEN SATURATION: 98 % | TEMPERATURE: 98.3 F

## 2022-09-23 DIAGNOSIS — S20.212A RIB CONTUSION, LEFT, INITIAL ENCOUNTER: Primary | ICD-10-CM

## 2022-09-23 PROCEDURE — 99213 OFFICE O/P EST LOW 20 MIN: CPT | Performed by: EMERGENCY MEDICINE

## 2022-09-23 PROCEDURE — 99213 OFFICE O/P EST LOW 20 MIN: CPT

## 2022-09-23 PROCEDURE — G0463 HOSPITAL OUTPT CLINIC VISIT: HCPCS

## 2022-09-23 PROCEDURE — 71101 X-RAY EXAM UNILAT RIBS/CHEST: CPT

## 2022-09-23 RX ORDER — CYCLOBENZAPRINE HCL 10 MG
10 TABLET ORAL 3 TIMES DAILY PRN
Qty: 21 TABLET | Refills: 0 | Status: SHIPPED | OUTPATIENT
Start: 2022-09-23 | End: 2022-10-03

## 2022-09-23 ASSESSMENT — PAIN DESCRIPTION - LOCATION: LOCATION: RIB CAGE

## 2022-09-23 ASSESSMENT — PAIN DESCRIPTION - FREQUENCY: FREQUENCY: CONTINUOUS

## 2022-09-23 ASSESSMENT — PAIN DESCRIPTION - ORIENTATION: ORIENTATION: LEFT

## 2022-09-23 ASSESSMENT — PAIN DESCRIPTION - PAIN TYPE: TYPE: ACUTE PAIN

## 2022-09-23 ASSESSMENT — ENCOUNTER SYMPTOMS
SHORTNESS OF BREATH: 0
WHEEZING: 0
ABDOMINAL PAIN: 0
COUGH: 1

## 2022-09-23 ASSESSMENT — PAIN SCALES - GENERAL: PAINLEVEL_OUTOF10: 7

## 2022-09-23 ASSESSMENT — PAIN - FUNCTIONAL ASSESSMENT: PAIN_FUNCTIONAL_ASSESSMENT: 0-10

## 2022-09-23 NOTE — ED PROVIDER NOTES
[facility] 36  Urgent Care Encounter       CHIEF COMPLAINT       Chief Complaint   Patient presents with    Rib Injury       Nurses Notes reviewed and I agree except as noted in the HPI. HISTORY OF PRESENT ILLNESS   Nadine Nava is a 43 y.o. male who presents for complaints of left-sided rib pain. Patient states he fell last week, he tripped over his dog. He fell and tried to catch himself with his arm. He states his elbow pressed rate into his left lateral ribs. He has been having pain and tenderness to this area. Last night he states he coughed and he felt a pop on one of the ribs. He states he was short of breath for a period of time. He states it is painful to take a deep breath. Patient denies any shortness of breath. Nonproductive cough that is rare. No fevers. Denies abdominal pain or any other injuries from his fall. HPI    REVIEW OF SYSTEMS     Review of Systems   Constitutional:  Negative for activity change, fatigue and fever. Respiratory:  Positive for cough. Negative for shortness of breath and wheezing. Cardiovascular:  Positive for chest pain (left sided rib pain). Gastrointestinal:  Negative for abdominal pain. Neurological:  Negative for dizziness and headaches. PAST MEDICAL HISTORY   History reviewed. No pertinent past medical history. SURGICALHISTORY     Patient  has no past surgical history on file. CURRENT MEDICATIONS       Discharge Medication List as of 9/23/2022  8:51 AM          ALLERGIES     Patient is has No Known Allergies. Patients   There is no immunization history on file for this patient. FAMILY HISTORY     Patient's family history includes Cancer in his father; No Known Problems in his mother, sister, and sister. SOCIAL HISTORY     Patient  reports that he has been smoking. He has a 10.00 pack-year smoking history. He has never used smokeless tobacco. He reports current alcohol use. He reports current drug use. Frequency: 2.00 times per week. Drug: Marijuana Charmayne Stai). PHYSICAL EXAM     ED TRIAGE VITALS  BP: 131/78, Temp: 98.3 °F (36.8 °C), Heart Rate: 78, Resp: 16, SpO2: 98 %,Estimated body mass index is 26.5 kg/m² as calculated from the following:    Height as of 4/20/22: 5' 11\" (1.803 m). Weight as of 4/20/22: 190 lb (86.2 kg). ,No LMP for male patient. Physical Exam  Constitutional:       General: He is not in acute distress. Appearance: He is normal weight. He is not ill-appearing. HENT:      Head: Normocephalic. Cardiovascular:      Rate and Rhythm: Normal rate and regular rhythm. Pulses: Normal pulses. Heart sounds: Normal heart sounds. Pulmonary:      Effort: Pulmonary effort is normal.      Breath sounds: Normal breath sounds. Chest:      Chest wall: Tenderness (left anterior lower chest) present. No mass, swelling or crepitus. Skin:     General: Skin is warm and dry. Neurological:      General: No focal deficit present. Mental Status: He is alert and oriented to person, place, and time. DIAGNOSTIC RESULTS     Labs:No results found for this visit on 09/23/22. IMAGING:    XR RIBS LEFT INCLUDE CHEST (MIN 3 VIEWS)   Final Result   No acute findings            **This report has been created using voice recognition software. It may contain minor errors which are inherent in voice recognition technology. **      Final report electronically signed by Dr. Rosemarie Pitts on 9/23/2022 8:41 AM            EKG:      URGENT CARE COURSE:     Vitals:    09/23/22 0819   BP: 131/78   Pulse: 78   Resp: 16   Temp: 98.3 °F (36.8 °C)   TempSrc: Temporal   SpO2: 98%       Medications - No data to display         PROCEDURES:  None    FINAL IMPRESSION      1. Rib contusion, left, initial encounter          DISPOSITION/ PLAN   Patient presents for his likely rib contusion. X-ray films were negative for acute fracture. Lungs are fully inflated. No acute intrathoracic process.   Patient be discharged advised to continue ibuprofen. Ice to the area. Take big, deep breaths every hour on the hour. Flexeril as prescribed as needed.   Return for new or worsening symptoms      PATIENT REFERRED TO:  CHERI Murguia CNP  [address] / [place] [address] [facility]:  Discharge Medication List as of 9/23/2022  8:51 AM        START taking these medications    Details   cyclobenzaprine (FLEXERIL) 10 MG tablet Take 1 tablet by mouth 3 times daily as needed for Muscle spasms, Disp-21 tablet, R-0Normal             Discharge Medication List as of 9/23/2022  8:51 AM        STOP taking these medications       Magic Mouthwash (MIRACLE MOUTHWASH) Comments:   Reason for Stopping:         ibuprofen (ADVIL;MOTRIN) 800 MG tablet Comments:   Reason for Stopping:         pantoprazole (PROTONIX) 20 MG tablet Comments:   Reason for Stopping:         promethazine (PHENERGAN) 25 MG tablet Comments:   Reason for Stopping:         ondansetron (ZOFRAN ODT) 4 MG disintegrating tablet Comments:   Reason for Stopping:               Discharge Medication List as of 9/23/2022  8:51 AM          Lyndal Babinski, APRN - CNP    (Please note that portions of this note were completed with a voice recognition program. Efforts were made to edit the dictations but occasionally words are mis-transcribed.)           Lyndal Babinski, APRN - CNP  09/23/22 1000

## 2022-09-23 NOTE — ED TRIAGE NOTES
Bk Richardson arrives to room with complaint of  tripped over dog left rib injury  symptoms started 1 weeks ago. Bk Richardson has pain and tenderness in his mid left ribs.

## 2022-09-23 NOTE — DISCHARGE INSTRUCTIONS
Take several deep breaths every hour on the hour while awake to expand your lungs    Continue ibuprofen as needed for pain    Flexeril may be beneficial.  This may make you sleepy. Do not drink alcohol or drive after taking.     Return for new or worsening symptoms

## 2022-09-23 NOTE — Clinical Note
Bright Contreras was seen and treated in our emergency department on 9/23/2022. He may return to work on 09/24/2022. If you have any questions or concerns, please don't hesitate to call.       Julieta Nina, APRN - CNP

## 2022-11-01 ENCOUNTER — APPOINTMENT (OUTPATIENT)
Dept: GENERAL RADIOLOGY | Age: 42
End: 2022-11-01
Payer: COMMERCIAL

## 2022-11-01 ENCOUNTER — HOSPITAL ENCOUNTER (EMERGENCY)
Age: 42
Discharge: HOME | End: 2022-11-01
Payer: COMMERCIAL

## 2022-11-01 VITALS
DIASTOLIC BLOOD PRESSURE: 69 MMHG | TEMPERATURE: 97.5 F | WEIGHT: 190 LBS | RESPIRATION RATE: 19 BRPM | SYSTOLIC BLOOD PRESSURE: 128 MMHG | HEART RATE: 53 BPM | OXYGEN SATURATION: 98 % | BODY MASS INDEX: 27.2 KG/M2 | HEIGHT: 70 IN

## 2022-11-01 DIAGNOSIS — R06.02 SHORTNESS OF BREATH: Primary | ICD-10-CM

## 2022-11-01 DIAGNOSIS — R07.9 CHEST PAIN, UNSPECIFIED TYPE: ICD-10-CM

## 2022-11-01 LAB
ANION GAP SERPL CALCULATED.3IONS-SCNC: 11 MEQ/L (ref 8–16)
BASOPHILS # BLD: 0.6 %
BASOPHILS ABSOLUTE: 0 THOU/MM3 (ref 0–0.1)
BUN BLDV-MCNC: 14 MG/DL (ref 7–22)
CALCIUM SERPL-MCNC: 9.8 MG/DL (ref 8.5–10.5)
CHLORIDE BLD-SCNC: 106 MEQ/L (ref 98–111)
CO2: 22 MEQ/L (ref 23–33)
CREAT SERPL-MCNC: 1.1 MG/DL (ref 0.4–1.2)
EKG ATRIAL RATE: 67 BPM
EKG P AXIS: 72 DEGREES
EKG P-R INTERVAL: 146 MS
EKG Q-T INTERVAL: 422 MS
EKG QRS DURATION: 94 MS
EKG QTC CALCULATION (BAZETT): 445 MS
EKG R AXIS: 80 DEGREES
EKG T AXIS: 65 DEGREES
EKG VENTRICULAR RATE: 67 BPM
EOSINOPHIL # BLD: 1.8 %
EOSINOPHILS ABSOLUTE: 0.1 THOU/MM3 (ref 0–0.4)
ERYTHROCYTE [DISTWIDTH] IN BLOOD BY AUTOMATED COUNT: 12.9 % (ref 11.5–14.5)
ERYTHROCYTE [DISTWIDTH] IN BLOOD BY AUTOMATED COUNT: 42.6 FL (ref 35–45)
GFR SERPL CREATININE-BSD FRML MDRD: > 60 ML/MIN/1.73M2
GLUCOSE BLD-MCNC: 98 MG/DL (ref 70–108)
HCT VFR BLD CALC: 39.5 % (ref 42–52)
HEMOGLOBIN: 13.5 GM/DL (ref 14–18)
IMMATURE GRANS (ABS): 0.01 THOU/MM3 (ref 0–0.07)
IMMATURE GRANULOCYTES: 0.2 %
LYMPHOCYTES # BLD: 40.7 %
LYMPHOCYTES ABSOLUTE: 2.6 THOU/MM3 (ref 1–4.8)
MCH RBC QN AUTO: 30.8 PG (ref 26–33)
MCHC RBC AUTO-ENTMCNC: 34.2 GM/DL (ref 32.2–35.5)
MCV RBC AUTO: 90 FL (ref 80–94)
MONOCYTES # BLD: 7.8 %
MONOCYTES ABSOLUTE: 0.5 THOU/MM3 (ref 0.4–1.3)
NUCLEATED RED BLOOD CELLS: 0 /100 WBC
OSMOLALITY CALCULATION: 278 MOSMOL/KG (ref 275–300)
PLATELET # BLD: 275 THOU/MM3 (ref 130–400)
PMV BLD AUTO: 9.6 FL (ref 9.4–12.4)
POTASSIUM SERPL-SCNC: 4.1 MEQ/L (ref 3.5–5.2)
RBC # BLD: 4.39 MILL/MM3 (ref 4.7–6.1)
SEG NEUTROPHILS: 48.9 %
SEGMENTED NEUTROPHILS ABSOLUTE COUNT: 3.1 THOU/MM3 (ref 1.8–7.7)
SODIUM BLD-SCNC: 139 MEQ/L (ref 135–145)
TROPONIN T: < 0.01 NG/ML
TROPONIN T: < 0.01 NG/ML
TSH SERPL DL<=0.05 MIU/L-ACNC: 2.02 UIU/ML (ref 0.4–4.2)
WBC # BLD: 6.3 THOU/MM3 (ref 4.8–10.8)

## 2022-11-01 PROCEDURE — 93005 ELECTROCARDIOGRAM TRACING: CPT | Performed by: EMERGENCY MEDICINE

## 2022-11-01 PROCEDURE — 93010 ELECTROCARDIOGRAM REPORT: CPT | Performed by: INTERNAL MEDICINE

## 2022-11-01 PROCEDURE — 6370000000 HC RX 637 (ALT 250 FOR IP): Performed by: NURSE PRACTITIONER

## 2022-11-01 PROCEDURE — 84443 ASSAY THYROID STIM HORMONE: CPT

## 2022-11-01 PROCEDURE — 36415 COLL VENOUS BLD VENIPUNCTURE: CPT

## 2022-11-01 PROCEDURE — 71045 X-RAY EXAM CHEST 1 VIEW: CPT

## 2022-11-01 PROCEDURE — 80048 BASIC METABOLIC PNL TOTAL CA: CPT

## 2022-11-01 PROCEDURE — 85025 COMPLETE CBC W/AUTO DIFF WBC: CPT

## 2022-11-01 PROCEDURE — 99285 EMERGENCY DEPT VISIT HI MDM: CPT

## 2022-11-01 PROCEDURE — 84484 ASSAY OF TROPONIN QUANT: CPT

## 2022-11-01 RX ORDER — ASPIRIN 81 MG/1
324 TABLET, CHEWABLE ORAL ONCE
Status: COMPLETED | OUTPATIENT
Start: 2022-11-01 | End: 2022-11-01

## 2022-11-01 RX ADMIN — ASPIRIN 81 MG 324 MG: 81 TABLET ORAL at 11:21

## 2022-11-01 ASSESSMENT — ENCOUNTER SYMPTOMS
COLOR CHANGE: 0
RHINORRHEA: 0
SHORTNESS OF BREATH: 1
CHEST TIGHTNESS: 1
VOMITING: 0
ABDOMINAL DISTENTION: 0
NAUSEA: 0
ABDOMINAL PAIN: 0
SORE THROAT: 0
DIARRHEA: 0
COUGH: 0

## 2022-11-01 ASSESSMENT — PAIN DESCRIPTION - DESCRIPTORS: DESCRIPTORS: ACHING

## 2022-11-01 ASSESSMENT — PAIN SCALES - GENERAL: PAINLEVEL_OUTOF10: 4

## 2022-11-01 ASSESSMENT — PAIN - FUNCTIONAL ASSESSMENT: PAIN_FUNCTIONAL_ASSESSMENT: 0-10

## 2022-11-01 ASSESSMENT — PAIN DESCRIPTION - LOCATION: LOCATION: HEAD

## 2022-11-01 ASSESSMENT — PAIN DESCRIPTION - PAIN TYPE: TYPE: ACUTE PAIN

## 2022-11-01 ASSESSMENT — HEART SCORE: ECG: 0

## 2022-11-01 NOTE — ED TRIAGE NOTES
Pt to ED via EMS w/rprts of sudden on set of chest pain, diaphoretic and generalized malaise. Pt rprts was at work this morning when suddenly became sweaty, weak and lightheaded. Sat in the car for about 30 minutes and began to hyperventilate worsening symptoms, feeling clammy, cold and numbness. Pt placed on monitor and in gown. EKG obtained. Protocol orders initiated.

## 2022-11-01 NOTE — ED NOTES
Pt denies all pains at this time. [place] provided. Call light remains within reach. Door shut.       Miri Rees RN  11/01/22 1037

## 2022-11-01 NOTE — ED PROVIDER NOTES
[facility] Emergency Department    CHIEF COMPLAINT       Chief Complaint   Patient presents with    Chest Pain    Dizziness              Nurses Notes reviewed and I agree except as noted in the HPI. HISTORY OF PRESENT ILLNESS    Jane Cano is a 43 y.o. male who presents to the ED for evaluation of chest pain, dizziness. Patient notes symptoms began this morning. Patient notes while at work overnight he began feeling funny around 5 AM.  He notes he started having significant sweats. He notes his vision was getting blurry. He was having shortness of breath and chest pain. He denies any nausea with this. He felt lightheaded and started having tremors. He noted symptoms would get worse with any kind of activity. He went to his office, and told his supervisor about this and they called 911. After this he started feeling numbness in his hands and feet. He denies any current symptoms. Denies being treated with any medications by EMS in route. He notes a family history of type 1 diabetes for his son. He denies any history of coronary artery disease for himself or his family. He notes he has not seen a doctor in probably 10 years. He notes he has smoked marijuana for 25 years and he quit 3 weeks ago. Also notes that he smoked cigarettes for 25 years and quit 2 weeks ago. Notes that he has a very stressful job and has had increased levels of stress lately. He notes he has been working long hours which is increased his stress level. He notes he plans on changing jobs in the future. \  HPI was provided by the patient. REVIEW OF SYSTEMS     Review of Systems   Constitutional:  Positive for activity change and diaphoresis. Negative for appetite change and fatigue. HENT:  Negative for congestion, rhinorrhea and sore throat. Respiratory:  Positive for chest tightness and shortness of breath. Negative for cough. Cardiovascular:  Positive for chest pain.    Gastrointestinal:  Negative for abdominal distention, abdominal pain, diarrhea, nausea and vomiting. Genitourinary:  Negative for decreased urine volume, difficulty urinating and dysuria. Musculoskeletal:  Negative for arthralgias. Skin:  Negative for color change and rash. Neurological:  Positive for dizziness, tremors and numbness. Negative for weakness. Hematological:  Does not bruise/bleed easily. Psychiatric/Behavioral:  Negative for agitation, behavioral problems and confusion. The patient is nervous/anxious. PAST MEDICAL HISTORY   History reviewed. No pertinent past medical history. SURGICALHISTORY      has no past surgical history on file. CURRENT MEDICATIONS       There are no discharge medications for this patient. ALLERGIES     has No Known Allergies. FAMILY HISTORY     He indicated that his mother is alive. He indicated that his father is alive. He indicated that both of his sisters are alive. family history includes Cancer in his father; No Known Problems in his mother, sister, and sister.     SOCIAL HISTORY       Social History     Socioeconomic History    Marital status:      Spouse name: Not on file    Number of children: Not on file    Years of education: Not on file    Highest education level: Not on file   Occupational History    Not on file   Tobacco Use    Smoking status: Every Day     Packs/day: 0.50     Years: 20.00     Pack years: 10.00     Types: Cigarettes    Smokeless tobacco: Never   Vaping Use    Vaping Use: Never used   Substance and Sexual Activity    Alcohol use: Yes     Comment: rarely     Drug use: Yes     Frequency: 2.0 times per week     Types: Marijuana Eleonora Cutler)    Sexual activity: Yes     Partners: Female     Birth control/protection: Pill   Other Topics Concern    Not on file   Social History Narrative    Not on file     Social Determinants of Health     Financial Resource Strain: Not on file   Food Insecurity: Not on file   Transportation Needs: Not on file   Physical Activity: Not on file   Stress: Not on file   Social Connections: Not on file   Intimate Partner Violence: Not on file   Housing Stability: Not on file       PHYSICAL EXAM     INITIAL VITALS:  height is 5' 10\" (1.778 m) and weight is 190 lb (86.2 kg). His oral temperature is 97.5 °F (36.4 °C). His blood pressure is 128/69 and his pulse is 53. His respiration is 19 and oxygen saturation is 98%. Physical Exam  Vitals and nursing note reviewed. Constitutional:       Appearance: Normal appearance. He is well-developed. HENT:      Head: Normocephalic. Right Ear: External ear normal.      Left Ear: External ear normal.      Nose: Nose normal.      Mouth/Throat:      Pharynx: Uvula midline. Eyes:      Conjunctiva/sclera: Conjunctivae normal.   Cardiovascular:      Rate and Rhythm: Normal rate and regular rhythm. Pulses: Normal pulses. Heart sounds: Normal heart sounds, S1 normal and S2 normal. No murmur heard. Pulmonary:      Effort: Pulmonary effort is normal. No respiratory distress. Breath sounds: Normal breath sounds. Chest:      Chest wall: No tenderness. Abdominal:      General: Bowel sounds are normal. There is no distension. Palpations: Abdomen is soft. Tenderness: There is no abdominal tenderness. Musculoskeletal:         General: Normal range of motion. Cervical back: Normal range of motion and neck supple. Skin:     General: Skin is warm and dry. Coloration: Skin is not pale. Findings: No erythema or rash. Neurological:      General: No focal deficit present. Mental Status: He is alert and oriented to person, place, and time. Cranial Nerves: No cranial nerve deficit. Sensory: No sensory deficit. Motor: No weakness. Gait: Gait normal.   Psychiatric:         Mood and Affect: Mood normal.         Behavior: Behavior normal.         Thought Content:  Thought content normal.         Judgment: Judgment normal.       DIFFERENTIAL DIAGNOSIS:   ACS, angina, anxiety, pneumonia, hyperthyroidism,    DIAGNOSTIC RESULTS     EKG: All EKG's are interpreted by the Emergency Department Physician who eithersigns or Co-signs this chart in the absence of a cardiologist.    EKG read and interpreted by myself gives impression of normal sinus rhythm with heart rate of 67; interval 146; QRS 94;QTc 445; axis P-72, R-80, T-65. RADIOLOGY: non-plainfilm images(s) such as CT, Ultrasound and MRI are read by the radiologist.  Plain radiographic images are visualized and preliminarily interpreted by the emergency physician unless otherwise stated below. XR CHEST PORTABLE   Final Result   . 1. Possible healing rib fracture along the left lower lateral rib cage. 2. Otherwise no acute cardiopulmonary disease. **This report has been created using voice recognition software. It may contain minor errors which are inherent in voice recognition technology. **      Final report electronically signed by DR Kristin Hugo on 11/1/2022 9:53 AM            LABS:   Labs Reviewed   CBC WITH AUTO DIFFERENTIAL - Abnormal; Notable for the following components:       Result Value    RBC 4.39 (*)     Hemoglobin 13.5 (*)     Hematocrit 39.5 (*)     All other components within normal limits   BASIC METABOLIC PANEL - Abnormal; Notable for the following components:    CO2 22 (*)     All other components within normal limits   TROPONIN   GLOMERULAR FILTRATION RATE, ESTIMATED   ANION GAP   OSMOLALITY   TROPONIN   TSH       EMERGENCY DEPARTMENT COURSE:   Vitals:    Vitals:    11/01/22 0926 11/01/22 0933 11/01/22 1133 11/01/22 1235   BP:  130/88  128/69   Pulse:  68 56 53   Resp: 17 24 21 19   Temp:       TempSrc:       SpO2: 98% 98% 100% 98%   Weight:       Height:             MDM    Patient was seen and evaluated in the emergency department, patient appeared to be in no acute distress, vital signs reviewed, hypertension noted.   Physical exam completed, no significant findings noted. Labs and imaging were ordered, patient was treated with aspirin. Labs reviewed, negative troponin, negative chest x-ray. Troponin was repeated at 2 hours, continues to be negative. Heart score is 2. I feel the patient's suitable for discharge he agrees. He is given a appointment with cardiology the following day at 9 AM.  They will evaluate for further evaluation. Discussed this with the patient he verbalized understanding. Advised return to the ER with worsening symptoms. He is also given contact information for family medicine clinic, as he has not seen anyone in over 10 years. Medications   aspirin chewable tablet 324 mg (324 mg Oral Given 11/1/22 1121)       Patient was seenindependently by myself. The patient's final impression and disposition and plan was determined by myself. CRITICAL CARE:   None    CONSULTS:  None    PROCEDURES:  None    FINAL IMPRESSION     1. Shortness of breath    2. Chest pain, unspecified type          DISPOSITION/PLAN   Patient discharged  PATIENT REFERREDTO:  [address] [facility] [address] [facility] [phone]  Go in 1 day  [address] [facility]. [address] [facility] [address] [facility]  Call   For follow up and evaluation    DISCHARGE MEDICATIONS:  There are no discharge medications for this patient. (Please note that portions of this note were completed with a voice recognition program.  Efforts were made to edit the dictations but occasionally words are mis-transcribed.)    Provider:  I personally performed the services described in the documentation,reviewed and edited the documentation which was dictated to the scribe in my presence, and it accurately records my words and actions.     Rebel Hernandez CNP 11/01/22 5:37 PM    CHERI Dunn - SONALI         [email or web address], APRN - CNP  11/01/22 8118

## 2022-11-02 ENCOUNTER — OFFICE VISIT (OUTPATIENT)
Dept: CARDIOLOGY CLINIC | Age: 42
End: 2022-11-02
Payer: COMMERCIAL

## 2022-11-02 VITALS
DIASTOLIC BLOOD PRESSURE: 79 MMHG | WEIGHT: 180.6 LBS | OXYGEN SATURATION: 98 % | SYSTOLIC BLOOD PRESSURE: 130 MMHG | HEART RATE: 62 BPM | HEIGHT: 71 IN | BODY MASS INDEX: 25.28 KG/M2

## 2022-11-02 DIAGNOSIS — R07.89 CHEST PAIN, ATYPICAL: ICD-10-CM

## 2022-11-02 PROCEDURE — 99203 OFFICE O/P NEW LOW 30 MIN: CPT | Performed by: INTERNAL MEDICINE

## 2022-11-02 RX ORDER — ASPIRIN 81 MG/1
81 TABLET ORAL DAILY
Qty: 90 TABLET | Refills: 1 | COMMUNITY
Start: 2022-11-02

## 2022-11-02 NOTE — PROGRESS NOTES
Chief Complaint   Patient presents with    Follow-up       Pt here for ER follow up for chest pain, dizziness and SOB. [address] for [employer]  Had a lots of stress at work and work 75 hrs a week      Chest pain, pressure, 5/10, nonradiating, associated sob and dizziness, last for 1 hr and resolved on its  own prior to arrival to ER. No previous hx of cp    Dizziness on standing yesterday while in pain and none before    Denied palpitation    Bedside ortho was negative ion ER      Quit smoking 2 weeks back after smoking 1ppd for 25 hrs    FHx  M= none for CAD        History reviewed. No pertinent surgical history.     No Known Allergies     Family History   Problem Relation Age of Onset    No Known Problems Mother     Cancer Father     No Known Problems Sister     No Known Problems Sister         Social History     Socioeconomic History    Marital status:      Spouse name: Not on file    Number of children: Not on file    Years of education: Not on file    Highest education level: Not on file   Occupational History    Not on file   Tobacco Use    Smoking status: Every Day     Packs/day: 0.50     Years: 20.00     Pack years: 10.00     Types: Cigarettes    Smokeless tobacco: Never   Vaping Use    Vaping Use: Never used   Substance and Sexual Activity    Alcohol use: Yes     Comment: rarely     Drug use: Yes     Frequency: 2.0 times per week     Types: Marijuana Kelsey Barrera)    Sexual activity: Yes     Partners: Female     Birth control/protection: Pill   Other Topics Concern    Not on file   Social History Narrative    Not on file     Social Determinants of Health     Financial Resource Strain: Not on file   Food Insecurity: Not on file   Transportation Needs: Not on file   Physical Activity: Not on file   Stress: Not on file   Social Connections: Not on file   Intimate Partner Violence: Not on file   Housing Stability: Not on file       Current Outpatient Medications   Medication Sig Dispense Refill aspirin EC 81 MG EC tablet Take 1 tablet by mouth daily 90 tablet 1     No current facility-administered medications for this visit. Review of Systems -     General ROS: negative  Psychological ROS: negative  Hematological and Lymphatic ROS: No history of blood clots or bleeding disorder. Respiratory ROS: no cough,  or wheezing, the rest see HPI  Cardiovascular ROS: See HPI  Gastrointestinal ROS: negative  Genito-Urinary ROS: no dysuria, trouble voiding, or hematuria  Musculoskeletal ROS: negative  Neurological ROS: no TIA or stroke symptoms  Dermatological ROS: negative      Blood pressure 130/79, pulse 62, height 5' 10.5\" (1.791 m), weight 180 lb 9.6 oz (81.9 kg), SpO2 98 %. Physical Examination:    General appearance - alert, well appearing, and in no distress  HEENT- Pink conjunctiva  , Non-icteri sclera,PERRLA  Mental status - alert, oriented to person, place, and time  Neck - supple, no significant adenopathy, no JVD, or carotid bruits  Chest - clear to auscultation, no wheezes, rales or rhonchi, symmetric air entry  Heart - normal rate, regular rhythm, normal S1, S2, no murmurs, rubs, clicks or gallops  Abdomen - soft, nontender, nondistended, no masses or organomegaly  ROXANA- no CVA or flank tenderness, no suprapubic tenderness  Neurological - alert, oriented, normal speech, no focal findings or movement disorder noted  Musculoskeletal/limbs - no joint tenderness, deformity or swelling   - peripheral pulses normal, no pedal edema, no clubbing or cyanosis  Skin - normal coloration and turgor, no rashes, no suspicious skin lesions noted  Psych- appropriate mood and affect    Lab  No results for input(s): CKTOTAL, CKMB, CKMBINDEX, TROPONINI in the last 72 hours.   CBC:   Lab Results   Component Value Date/Time    WBC 6.3 11/01/2022 09:50 AM    RBC 4.39 11/01/2022 09:50 AM    HGB 13.5 11/01/2022 09:50 AM    HCT 39.5 11/01/2022 09:50 AM    MCV 90.0 11/01/2022 09:50 AM    MCH 30.8 11/01/2022 09:50 AM MCHC 34.2 11/01/2022 09:50 AM    RDW 14.5 05/03/2018 08:50 AM     11/01/2022 09:50 AM    MPV 9.6 11/01/2022 09:50 AM     BMP:    Lab Results   Component Value Date/Time     11/01/2022 09:50 AM    K 4.1 11/01/2022 09:50 AM     11/01/2022 09:50 AM    CO2 22 11/01/2022 09:50 AM    BUN 14 11/01/2022 09:50 AM    LABALBU 4.9 10/11/2018 02:37 PM    CREATININE 1.1 11/01/2022 09:50 AM    CALCIUM 9.8 11/01/2022 09:50 AM    LABGLOM >60 11/01/2022 09:31 AM    GLUCOSE 98 11/01/2022 09:50 AM     Hepatic Function Panel:    Lab Results   Component Value Date/Time    ALKPHOS 68 10/11/2018 02:37 PM    ALT 13 10/11/2018 02:37 PM    AST 20 10/11/2018 02:37 PM    PROT 7.5 10/11/2018 02:37 PM    BILITOT 0.7 10/11/2018 02:37 PM    BILIDIR <0.2 10/11/2018 02:37 PM    LABALBU 4.9 10/11/2018 02:37 PM     Magnesium:  No results found for: MG  Warfarin PT/INR:  No components found for: PTPATWAR, PTINRWAR  HgBA1c:  No results found for: LABA1C  FLP:    Lab Results   Component Value Date/Time    TRIG 76 05/03/2018 08:50 AM    HDL 36 05/03/2018 08:50 AM    LDLCALC 110 05/03/2018 08:50 AM     TSH:    Lab Results   Component Value Date/Time    TSH 2.020 11/01/2022 09:50 AM       Ekg 11/02/22  Normal sinus rhythm with sinus arrhythmia Normal ECG No previous ECGs available    Troponin neg    Assessment       Diagnosis Orders   1.  Chest pain, atypical  ECHO Complete 2D W Doppler W Color    NM MYOCARDIAL SPECT REST EXERCISE OR RX            Plan     The med a nd labs reviewed    Record from ER reviewed    Chest pain atypical  Echo  Ex nuc   Cont asa 81    D/w the pat the plan of care    Rtc in 3 weeks      Micheline Grant, [facility]

## 2022-11-11 ENCOUNTER — HOSPITAL ENCOUNTER (OUTPATIENT)
Dept: NON INVASIVE DIAGNOSTICS | Age: 42
Discharge: HOME | End: 2022-11-11
Payer: COMMERCIAL

## 2022-11-11 ENCOUNTER — APPOINTMENT (OUTPATIENT)
Dept: NON INVASIVE DIAGNOSTICS | Age: 42
End: 2022-11-11
Payer: COMMERCIAL

## 2022-11-11 DIAGNOSIS — R07.89 CHEST PAIN, ATYPICAL: ICD-10-CM

## 2022-11-11 LAB
LV EF: 65 %
LVEF MODALITY: NORMAL

## 2022-11-11 PROCEDURE — 93306 TTE W/DOPPLER COMPLETE: CPT

## 2022-11-14 ENCOUNTER — OFFICE VISIT (OUTPATIENT)
Dept: FAMILY MEDICINE CLINIC | Age: 42
End: 2022-11-14
Payer: COMMERCIAL

## 2022-11-14 VITALS
DIASTOLIC BLOOD PRESSURE: 64 MMHG | OXYGEN SATURATION: 99 % | HEIGHT: 70 IN | TEMPERATURE: 97.9 F | HEART RATE: 98 BPM | BODY MASS INDEX: 25.77 KG/M2 | WEIGHT: 180 LBS | SYSTOLIC BLOOD PRESSURE: 114 MMHG

## 2022-11-14 DIAGNOSIS — Z09 HOSPITAL DISCHARGE FOLLOW-UP: ICD-10-CM

## 2022-11-14 DIAGNOSIS — R07.89 CHEST PAIN, ATYPICAL: Primary | ICD-10-CM

## 2022-11-14 DIAGNOSIS — Z13.1 SCREENING FOR DIABETES MELLITUS: ICD-10-CM

## 2022-11-14 DIAGNOSIS — Z13.220 SCREENING, LIPID: ICD-10-CM

## 2022-11-14 PROCEDURE — 99213 OFFICE O/P EST LOW 20 MIN: CPT

## 2022-11-14 PROCEDURE — 1111F DSCHRG MED/CURRENT MED MERGE: CPT

## 2022-11-14 SDOH — ECONOMIC STABILITY: FOOD INSECURITY: WITHIN THE PAST 12 MONTHS, THE FOOD YOU BOUGHT JUST DIDN'T LAST AND YOU DIDN'T HAVE MONEY TO GET MORE.: NEVER TRUE

## 2022-11-14 SDOH — HEALTH STABILITY: PHYSICAL HEALTH: ON AVERAGE, HOW MANY DAYS PER WEEK DO YOU ENGAGE IN MODERATE TO STRENUOUS EXERCISE (LIKE A BRISK WALK)?: 7 DAYS

## 2022-11-14 SDOH — ECONOMIC STABILITY: FOOD INSECURITY: WITHIN THE PAST 12 MONTHS, YOU WORRIED THAT YOUR FOOD WOULD RUN OUT BEFORE YOU GOT MONEY TO BUY MORE.: NEVER TRUE

## 2022-11-14 ASSESSMENT — ENCOUNTER SYMPTOMS
ABDOMINAL PAIN: 0
DIARRHEA: 0
NAUSEA: 0
COUGH: 0
SHORTNESS OF BREATH: 0
CHEST TIGHTNESS: 0
VOMITING: 0
CONSTIPATION: 0

## 2022-11-14 ASSESSMENT — PATIENT HEALTH QUESTIONNAIRE - PHQ9
1. LITTLE INTEREST OR PLEASURE IN DOING THINGS: 0
SUM OF ALL RESPONSES TO PHQ QUESTIONS 1-9: 0
2. FEELING DOWN, DEPRESSED OR HOPELESS: 0
SUM OF ALL RESPONSES TO PHQ9 QUESTIONS 1 & 2: 0

## 2022-11-14 ASSESSMENT — SOCIAL DETERMINANTS OF HEALTH (SDOH)
WITHIN THE LAST YEAR, HAVE TO BEEN RAPED OR FORCED TO HAVE ANY KIND OF SEXUAL ACTIVITY BY YOUR PARTNER OR EX-PARTNER?: NO
HOW HARD IS IT FOR YOU TO PAY FOR THE VERY BASICS LIKE FOOD, HOUSING, MEDICAL CARE, AND HEATING?: NOT VERY HARD
WITHIN THE LAST YEAR, HAVE YOU BEEN AFRAID OF YOUR PARTNER OR EX-PARTNER?: NO
WITHIN THE LAST YEAR, HAVE YOU BEEN KICKED, HIT, SLAPPED, OR OTHERWISE PHYSICALLY HURT BY YOUR PARTNER OR EX-PARTNER?: NO
WITHIN THE LAST YEAR, HAVE YOU BEEN HUMILIATED OR EMOTIONALLY ABUSED IN OTHER WAYS BY YOUR PARTNER OR EX-PARTNER?: NO

## 2022-11-14 NOTE — PROGRESS NOTES
[address] [facility] [address] [address]  Dept: [phone]  Loc: Jaclyn[address] Bruce Conway (:  1980) is a 43 y.o. male, New patient, here for evaluation of the following chief complaint(s):  Follow-Up from Hospital (For chest pain & dizziness)      ASSESSMENT/PLAN:  1. Chest pain, atypical  -     TSH; Future  -     T4, Free; Future  2. Screening, lipid  -     Lipid, Fasting; Future  3. Screening for diabetes mellitus  -     Hemoglobin A1C; Future  4. Hospital discharge follow-up  -     IL DISCHARGE MEDS RECONCILED W/ CURRENT OUTPATIENT MED LIST    Potential causes considered but not limited to; msk, Coronary spasm, esophageal in nature. Troponin negative, EKG unremarkable, negative ED workup. Please see resident note for full Assessment/Plan, HPI, ROS and PE    Return in about 2 weeks (around 2022) for establish care wtih Dr. Vivian Merino.    SUBJECTIVE/OBJECTIVE:  HPI  44 yo male, no prior PMH. Presents as ED follow up for chest pain that started at work. Pressure, like someone sitting on chest associated with diaphoresis. ED workup unremarkable: Echo EF: 65%, troponin negative, EKG unremarkable. Reports heavy work schedule and stress, put in 2 week notice. Quite smoking tobacco and marijuana 1.5 months ago. Vapping on occasion for cravings. Stress test . Would like to establish care with our office. Vitals:    22 0826   BP: 114/64   Site: Left Upper Arm   Position: Sitting   Cuff Size: Medium Adult   Pulse: 98   Temp: 97.9 °F (36.6 °C)   TempSrc: Temporal   SpO2: 99%   Weight: 180 lb (81.6 kg)   Height: 5' 10\" (1.778 m)         An electronic signature was used to authenticate this note.     --David Bills MD

## 2022-11-14 NOTE — PROGRESS NOTES
S: 43 y.o. male with   Chief Complaint   Patient presents with    Follow-Up from Hospital     For chest pain & dizziness     CP episode on the job -- diaphoresis, SOB. Work up in hospital was negative. Echo/EKG normal. No stress test.   Mental stress -- 60-80 hours on stressful job.  on job -- has put in 2 week resignation there   Stopped smoking 1 mo ago  Stopped THC a few weeks ago. Occassional vaping  Admits to nightmare  No medical care for 10 years    Family History   Problem Relation Age of Onset    No Known Problems Mother     Cancer Father     No Known Problems Sister     No Known Problems Sister          BP Readings from Last 3 Encounters:   11/14/22 114/64   11/02/22 130/79   11/01/22 128/69     Wt Readings from Last 3 Encounters:   11/14/22 180 lb (81.6 kg)   11/02/22 180 lb 9.6 oz (81.9 kg)   11/01/22 190 lb (86.2 kg)       O: VS:   Vitals:    11/14/22 0826   BP: 114/64   Site: Left Upper Arm   Position: Sitting   Cuff Size: Medium Adult   Pulse: 98   Temp: 97.9 °F (36.6 °C)   TempSrc: Temporal   SpO2: 99%   Weight: 180 lb (81.6 kg)   Height: 5' 10\" (1.778 m)     Body mass index is 25.83 kg/m².     AAO/NAD, appropriate affect for mood  Normocephalic, atraumatic, eyes - conjunctiva and sclera normal,   skin no rashes on exposed areas   Insight, judgement normal and in no acute distress      Lab Results   Component Value Date    WBC 6.3 11/01/2022    HGB 13.5 (L) 11/01/2022    HCT 39.5 (L) 11/01/2022     11/01/2022    CHOL 161 05/03/2018    TRIG 76 05/03/2018    HDL 36 05/03/2018    LDLCALC 110 05/03/2018    AST 20 10/11/2018     11/01/2022    K 4.1 11/01/2022     11/01/2022    CREATININE 1.1 11/01/2022    BUN 14 11/01/2022    CO2 22 (L) 11/01/2022    TSH 2.020 11/01/2022    LABGLOM >60 11/01/2022    CALCIUM 9.8 11/01/2022       XR CHEST PORTABLE    Result Date: 11/1/2022  PROCEDURE: XR CHEST PORTABLE CLINICAL INFORMATION: cp.  COMPARISON: Left rib series dated 9/23/2022. TECHNIQUE: AP upright view of the chest. FINDINGS: The heart size is normal.The mediastinum is not widened. There are no pulmonary infiltrates or effusions. The pulmonary vascularity is normal. There is slight irregularity along the left lower lateral rib cage. This may represent a healing rib fracture. . 1. Possible healing rib fracture along the left lower lateral rib cage. 2. Otherwise no acute cardiopulmonary disease. **This report has been created using voice recognition software. It may contain minor errors which are inherent in voice recognition technology. ** Final report electronically signed by DR Home Goodwin on 11/1/2022 9:53 AM       Diagnosis Orders   1. Chest pain, atypical  TSH    T4, Free      2. Screening, lipid  Lipid, Fasting      3. Screening for diabetes mellitus  Hemoglobin A1C      4. Hospital discharge follow-up  UT DISCHARGE MEDS RECONCILED W/ CURRENT OUTPATIENT MED LIST          Plan    Chest pain -- etiology not clear but consider esophageal spasm or even coronary spasm under significant stress  Risk stratification with labs   Congratulations on smoking cessation and reducing THC  Encouraged healthy lifestyle       Return in about 2 weeks (around 11/28/2022) for hospitals care [facility] Dr. Derek Palumbo.    Orders Placed:  No orders of the defined types were placed in this encounter. Medications Prescribed:  No orders of the defined types were placed in this encounter.       Future Appointments   Date Time Provider Umm Manzano   11/25/2022 12:30 PM STR [facility].  INJECT  RM2 STRZ STRESS [facility]   11/25/2022  1:00 PM [facility] [address]   11/25/2022  1:30 PM STR NM STRESS RM1 STRZ STRESS [facility]   11/25/2022  2:00 PM [facility] [address]   11/30/2022 10:15 AM Jose Talavera MD N [facility] Maintenance Due   Topic Date Due    COVID-19 Vaccine (1) Never done    Varicella vaccine (1 of 2 - 2-dose childhood series) Never done    Pneumococcal 0-64 years Vaccine (1 - PCV) Never done    Depression Screen  Never done    Hepatitis C screen  Never done    DTaP/Tdap/Td vaccine (1 - Tdap) Never done    Flu vaccine (1) Never done         Attending Physician Statement  I have discussed the case, including pertinent history and exam findings with the resident. I also have seen the patient and performed key portions of the examination. I agree with the documented assessment and plan as documented by the resident.   GE modifier added to this encounter      Debora Carranza MD 11/14/2022 8:43 AM

## 2022-11-14 NOTE — PROGRESS NOTES
Linda Victoria (:  1980) is a 43 y.o. male,New patient, here for evaluation of the following chief complaint(s):  Follow-Up from Hospital (For chest pain & dizziness)         ASSESSMENT/PLAN:  1. Chest pain, atypical  -     TSH; Future  -     T4, Free; Future  Patient presented for chest pain to the ED on . EKG showed normal sinus rhythm with no acute ST segment changes. Echo showed EF is 65% with no wall motion abnormalities. Troponin x2 negative. At this time differential includes coronary vasospasm, GERD, stress-induced, esophageal spasm. Given the grossly negative labs and testing, believe this is likely stress-induced chest pain. Patient does have a stress test scheduled for  and will await those results. At this time no further lab work is needed. 2. Screening, lipid  -     Lipid, Fasting; Future  Patient would like to establish care here and will establish care in the next coming weeks. We will get basic lab work for that time. 3. Screening for diabetes mellitus  -     Hemoglobin A1C; Future  Patient would like to establish care here will establish care in the next coming weeks. We will get basic lab work for that time  4. Hospital discharge follow-up  -     SC DISCHARGE MEDS RECONCILED W/ CURRENT OUTPATIENT MED LIST  See #1 above. Return in about 2 weeks (around 2022) for establish care [facility] Dr. Javan Jacobo.         Subjective   SUBJECTIVE/OBJECTIVE:  Natalia Velazquez is a 51-year-old male with no past medical history presents for an ED follow-up. Patient states a couple weeks ago he was having chest pain, went into work, was diaphoretic, when his boss decided to call the squad and subsequently brought him to [facility] ED. All work-up in the ED was grossly unremarkable. Since his episode of chest pain, he has denied any further episodes of chest pain, no chest pain with exertion.   He denies any dyspnea, cough, congestion, nausea, vomiting, diarrhea, constipation, acid reflux. He does note that he is increasingly stressed and has a lot of mental stress at work. He works 79 to [de-identified] hours/week 7 days a week as a  and also works in [place] use for the past 5 years. He also notes that he quit smoking about a month and a half ago but still occasionally vapes, quit smoking marijuana 2 months ago. Patient has a cardiac stress test scheduled on November 25. Of note the patient has been having nightmares at night starting a month ago. Review of Systems   Constitutional:  Negative for chills, diaphoresis and fever. HENT:  Negative for congestion. Respiratory:  Negative for cough, chest tightness and shortness of breath. Cardiovascular:  Negative for chest pain. Gastrointestinal:  Negative for abdominal pain, constipation, diarrhea, nausea and vomiting. Objective   Vitals:    11/14/22 0826   BP: 114/64   Pulse: 98   Temp: 97.9 °F (36.6 °C)   SpO2: 99%       Physical Exam  Constitutional:       General: He is not in acute distress. Appearance: Normal appearance. He is normal weight. He is not ill-appearing, toxic-appearing or diaphoretic. HENT:      Head: Normocephalic and atraumatic. Cardiovascular:      Rate and Rhythm: Normal rate and regular rhythm. Pulses: Normal pulses. Heart sounds: Normal heart sounds. No murmur heard. No friction rub. No gallop. Pulmonary:      Effort: Pulmonary effort is normal. No respiratory distress. Breath sounds: Normal breath sounds. No wheezing, rhonchi or rales. Chest:      Chest wall: No tenderness. Abdominal:      General: Abdomen is flat. Bowel sounds are normal. There is no distension. Palpations: Abdomen is soft. Tenderness: There is no abdominal tenderness. Neurological:      General: No focal deficit present. Mental Status: He is alert and oriented to person, place, and time. Mental status is at baseline.           On this date 11/14/2022 I have spent 60 minutes reviewing previous notes, test results and face to face with the patient discussing the diagnosis and importance of compliance with the treatment plan as well as documenting on the day of the visit. An electronic signature was used to authenticate this note.     --Stevo Lopez, DO

## 2022-11-30 ENCOUNTER — TELEPHONE (OUTPATIENT)
Dept: CARDIOLOGY CLINIC | Age: 42
End: 2022-11-30

## 2022-12-14 ENCOUNTER — HOSPITAL ENCOUNTER (EMERGENCY)
Age: 42
Discharge: HOME | End: 2022-12-14
Payer: MEDICAID

## 2022-12-14 VITALS
HEART RATE: 91 BPM | RESPIRATION RATE: 19 BRPM | WEIGHT: 185 LBS | HEIGHT: 70 IN | DIASTOLIC BLOOD PRESSURE: 74 MMHG | TEMPERATURE: 98.5 F | BODY MASS INDEX: 26.48 KG/M2 | SYSTOLIC BLOOD PRESSURE: 131 MMHG | OXYGEN SATURATION: 97 %

## 2022-12-14 DIAGNOSIS — K04.7 DENTAL ABSCESS: Primary | ICD-10-CM

## 2022-12-14 PROCEDURE — 99213 OFFICE O/P EST LOW 20 MIN: CPT | Performed by: NURSE PRACTITIONER

## 2022-12-14 PROCEDURE — 99213 OFFICE O/P EST LOW 20 MIN: CPT

## 2022-12-14 RX ORDER — AMOXICILLIN AND CLAVULANATE POTASSIUM 875; 125 MG/1; MG/1
1 TABLET, FILM COATED ORAL 2 TIMES DAILY
Qty: 20 TABLET | Refills: 0 | Status: SHIPPED | OUTPATIENT
Start: 2022-12-14 | End: 2022-12-24

## 2022-12-14 ASSESSMENT — ENCOUNTER SYMPTOMS
SORE THROAT: 0
COUGH: 0
NAUSEA: 0
FACIAL SWELLING: 0
SHORTNESS OF BREATH: 0
VOMITING: 0

## 2022-12-14 NOTE — ED TRIAGE NOTES
Pt presents to  with c/odental pain for 1 week. Pt reports right sided lower tooth pain. Reports he has a decayed tooth and is unable to find a dentist. Reports taking Motrin.  Denies fevers

## 2022-12-14 NOTE — ED PROVIDER NOTES
[facility]  Urgent Care Encounter       CHIEF COMPLAINT       Chief Complaint   Patient presents with    Dental Pain       Nurses Notes reviewed and I agree except as noted in the HPI. HISTORY OF PRESENT ILLNESS   Alexandria Herrmann is a 43 y.o. male who presents for evaluation of right lower dental pain that has been ongoing for the past 4 days. Patient reports a history of very poor teeth as well as broken tooth. States that he does have an appointment to see a dentist in 2 months but cannot get in any sooner. He denies any fever, chills, nausea, or vomiting. The history is provided by the patient. REVIEW OF SYSTEMS     Review of Systems   Constitutional:  Negative for chills and fever. HENT:  Positive for dental problem. Negative for congestion, facial swelling and sore throat. Respiratory:  Negative for cough and shortness of breath. Cardiovascular:  Negative for chest pain. Gastrointestinal:  Negative for nausea and vomiting. Musculoskeletal:  Negative for arthralgias and myalgias. Skin:  Negative for rash. Allergic/Immunologic: Negative for immunocompromised state. Neurological:  Negative for headaches. PAST MEDICAL HISTORY   No past medical history on file. SURGICALHISTORY     Patient  has no past surgical history on file. CURRENT MEDICATIONS       Previous Medications    ASPIRIN EC 81 MG EC TABLET    Take 1 tablet by mouth daily       ALLERGIES     Patient is has No Known Allergies. Patients   There is no immunization history on file for this patient. FAMILY HISTORY     Patient's family history includes Cancer in his father; No Known Problems in his mother, sister, and sister. SOCIAL HISTORY     Patient  reports that he quit smoking about 2 months ago. His smoking use included cigarettes. He has a 10.00 pack-year smoking history. He has never used smokeless tobacco. He reports current alcohol use.  He reports that he does not currently use drugs after having used the following drugs: Marijuana Marisa Palm). Frequency: 2.00 times per week. PHYSICAL EXAM     ED TRIAGE VITALS  BP: 131/74, Temp: 98.5 °F (36.9 °C), Heart Rate: 91, Resp: 19, SpO2: 97 %,Estimated body mass index is 26.54 kg/m² as calculated from the following:    Height as of this encounter: 5' 10\" (1.778 m). Weight as of this encounter: 185 lb (83.9 kg). ,No LMP for male patient. Physical Exam  Vitals and nursing note reviewed. Constitutional:       General: He is not in acute distress. Appearance: He is well-developed. He is not diaphoretic. HENT:      Mouth/Throat:      Dentition: Abnormal dentition. Dental tenderness, dental caries and dental abscesses present. Eyes:      Conjunctiva/sclera:      Right eye: Right conjunctiva is not injected. Left eye: Left conjunctiva is not injected. Pupils: Pupils are equal.   Cardiovascular:      Rate and Rhythm: Normal rate and regular rhythm. Heart sounds: No murmur heard. Pulmonary:      Effort: Pulmonary effort is normal. No respiratory distress. Breath sounds: Normal breath sounds. Musculoskeletal:      Cervical back: Normal range of motion. Skin:     General: Skin is warm. Findings: No rash. Neurological:      Mental Status: He is alert and oriented to person, place, and time. Psychiatric:         Behavior: Behavior normal.       DIAGNOSTIC RESULTS     Labs:No results found for this visit on 12/14/22. IMAGING:    No orders to display         EKG:      URGENT CARE COURSE:     Vitals:    12/14/22 1059 12/14/22 1100   BP:  131/74   Pulse: 91    Resp: 19    Temp: 98.5 °F (36.9 °C)    SpO2: 97%    Weight: 185 lb (83.9 kg)    Height: 5' 10\" (1.778 m)        Medications - No data to display         PROCEDURES:  None    FINAL IMPRESSION      1. Dental abscess          DISPOSITION/ PLAN     Exam is consistent with a dental abscess.   Patient will be placed on oral antibiotics and is advised to follow-up with his dentist as scheduled. He is instructed present to the ER if his symptoms worsen and to use Tylenol and ibuprofen at home. He is agreeable to plan as discussed. PATIENT REFERRED TO:  Bryanna King DO  [address] [address]      DISCHARGE MEDICATIONS:  New Prescriptions    AMOXICILLIN-CLAVULANATE (AUGMENTIN) 875-125 MG PER TABLET    Take 1 tablet by mouth 2 times daily for 10 days       Discontinued Medications    No medications on file       Current Discharge Medication List          CHERI Hayes CNP    (Please note that portions of this note were completed with a voice recognition program. Efforts were made to edit the dictations but occasionally words are mis-transcribed.)          CHERI Hayes CNP  12/14/22 6102

## 2022-12-14 NOTE — LETTER
[facility]  [address] [place] [id number]  Phone: [phone]               December 14, 2022    Patient: Linnea Pastor   YOB: 1980   Date of Visit: 12/14/2022       To Whom It May Concern:    Kevin Posadas was seen and treated in our emergency department on 12/14/2022.  He may return to work 12/15/2022      Sincerely,       Noemi Cortes RN      Signature:__________________________________

## 2023-03-29 ENCOUNTER — HOSPITAL ENCOUNTER (EMERGENCY)
Age: 43
Discharge: HOME | End: 2023-03-29
Payer: MEDICAID

## 2023-03-29 VITALS
TEMPERATURE: 98 F | HEART RATE: 61 BPM | OXYGEN SATURATION: 98 % | HEIGHT: 70 IN | SYSTOLIC BLOOD PRESSURE: 134 MMHG | WEIGHT: 195 LBS | BODY MASS INDEX: 27.92 KG/M2 | DIASTOLIC BLOOD PRESSURE: 86 MMHG | RESPIRATION RATE: 16 BRPM

## 2023-03-29 DIAGNOSIS — K08.89 DENTALGIA: Primary | ICD-10-CM

## 2023-03-29 DIAGNOSIS — K04.7 DENTAL INFECTION: ICD-10-CM

## 2023-03-29 PROCEDURE — 99213 OFFICE O/P EST LOW 20 MIN: CPT | Performed by: NURSE PRACTITIONER

## 2023-03-29 PROCEDURE — 99213 OFFICE O/P EST LOW 20 MIN: CPT

## 2023-03-29 RX ORDER — PENICILLIN V POTASSIUM 500 MG/1
500 TABLET ORAL 4 TIMES DAILY
Qty: 40 TABLET | Refills: 0 | Status: SHIPPED | OUTPATIENT
Start: 2023-03-29 | End: 2023-04-08

## 2023-03-29 ASSESSMENT — PAIN DESCRIPTION - PAIN TYPE: TYPE: CHRONIC PAIN

## 2023-03-29 ASSESSMENT — PAIN DESCRIPTION - DESCRIPTORS: DESCRIPTORS: THROBBING

## 2023-03-29 ASSESSMENT — PAIN DESCRIPTION - ONSET: ONSET: GRADUAL

## 2023-03-29 ASSESSMENT — PAIN - FUNCTIONAL ASSESSMENT
PAIN_FUNCTIONAL_ASSESSMENT: 0-10
PAIN_FUNCTIONAL_ASSESSMENT: PREVENTS OR INTERFERES SOME ACTIVE ACTIVITIES AND ADLS

## 2023-03-29 ASSESSMENT — ENCOUNTER SYMPTOMS
SORE THROAT: 0
EYES NEGATIVE: 1
SHORTNESS OF BREATH: 0
ABDOMINAL PAIN: 0
ALLERGIC/IMMUNOLOGIC NEGATIVE: 1

## 2023-03-29 ASSESSMENT — PAIN DESCRIPTION - FREQUENCY: FREQUENCY: CONTINUOUS

## 2023-03-29 ASSESSMENT — PAIN DESCRIPTION - ORIENTATION: ORIENTATION: RIGHT;LOWER

## 2023-03-29 ASSESSMENT — PAIN DESCRIPTION - LOCATION: LOCATION: TEETH

## 2023-03-29 ASSESSMENT — PAIN SCALES - GENERAL: PAINLEVEL_OUTOF10: 8

## 2023-03-29 NOTE — ED PROVIDER NOTES
smokeless tobacco. He reports current alcohol use. He reports that he does not currently use drugs after having used the following drugs: Marijuana Jeanne Ege). Frequency: 2.00 times per week. PHYSICAL EXAM     ED TRIAGE VITALS  BP: 134/86, Temp: 98 °F (36.7 °C), Heart Rate: 61, Resp: 16, SpO2: 98 %  Physical Exam  Constitutional:       General: He is not in acute distress. Appearance: He is well-developed. He is not ill-appearing or toxic-appearing. HENT:      Head: Normocephalic and atraumatic. Right Ear: Tympanic membrane normal.      Left Ear: Tympanic membrane normal.      Nose: No congestion or rhinorrhea. Mouth/Throat:      Mouth: Mucous membranes are moist.      Dentition: Gingival swelling and dental caries present. Pharynx: Oropharynx is clear. No pharyngeal swelling, posterior oropharyngeal erythema or uvula swelling. Tonsils: 0 on the right. 0 on the left. Comments: No visible abscess noted. Patient has multiple broken teeth present with multiple dental caries throughout. Eyes:      Conjunctiva/sclera: Conjunctivae normal.      Pupils: Pupils are equal, round, and reactive to light. Cardiovascular:      Rate and Rhythm: Normal rate and regular rhythm. Heart sounds: No murmur heard. Pulmonary:      Effort: Pulmonary effort is normal.      Breath sounds: Normal breath sounds. No wheezing. Abdominal:      General: Bowel sounds are normal.      Palpations: Abdomen is soft. Tenderness: There is no abdominal tenderness. Musculoskeletal:      Cervical back: Normal range of motion and neck supple. Lymphadenopathy:      Cervical: No cervical adenopathy. Skin:     General: Skin is warm and dry. Capillary Refill: Capillary refill takes less than 2 seconds. Neurological:      General: No focal deficit present. Mental Status: He is alert and oriented to person, place, and time.    Psychiatric:         Mood and Affect: Mood normal.         Behavior: Behavior normal.       DIAGNOSTIC RESULTS   Labs:No results found for this visit on 03/29/23. IMAGING:  No orders to display     URGENT CARE COURSE:         Medications - No data to display  PROCEDURES:  FINALIMPRESSION      1. Dentalgia    2. Dental infection        DISPOSITION/PLAN   DISPOSITION Decision To Discharge 03/29/2023 10:48:21 AM    We will start on Pen-Vee K for probable dental infection. There is no visible abscess. Recommend reporting to ER with new or severe symptoms. Continue acetaminophen and ibuprofen as needed for pain. Patient denies questions. PATIENT REFERRED TO:  Andie Babb DO  [address] [place] [address]  [phone]      As needed, If symptoms worsen  DISCHARGE MEDICATIONS:  Discharge Medication List as of 3/29/2023 10:49 AM        START taking these medications    Details   penicillin v potassium (VEETID) 500 MG tablet Take 1 tablet by mouth 4 times daily for 10 days, Disp-40 tablet, R-0Normal           Discharge Medication List as of 3/29/2023 10:49 AM          CHERI Remy - CHERI Soriano - SONALI  03/29/23 1152